# Patient Record
Sex: MALE | Race: WHITE | NOT HISPANIC OR LATINO | Employment: UNEMPLOYED | URBAN - NONMETROPOLITAN AREA
[De-identification: names, ages, dates, MRNs, and addresses within clinical notes are randomized per-mention and may not be internally consistent; named-entity substitution may affect disease eponyms.]

---

## 2020-11-03 ENCOUNTER — OFFICE VISIT (OUTPATIENT)
Dept: FAMILY MEDICINE CLINIC | Facility: CLINIC | Age: 50
End: 2020-11-03

## 2020-11-03 ENCOUNTER — LAB (OUTPATIENT)
Dept: LAB | Facility: HOSPITAL | Age: 50
End: 2020-11-03

## 2020-11-03 VITALS
HEIGHT: 71 IN | TEMPERATURE: 96.9 F | BODY MASS INDEX: 29.48 KG/M2 | DIASTOLIC BLOOD PRESSURE: 80 MMHG | SYSTOLIC BLOOD PRESSURE: 120 MMHG | OXYGEN SATURATION: 98 % | HEART RATE: 76 BPM | WEIGHT: 210.6 LBS

## 2020-11-03 DIAGNOSIS — F15.91 HISTORY OF METHAMPHETAMINE USE: ICD-10-CM

## 2020-11-03 DIAGNOSIS — F41.9 ANXIETY: ICD-10-CM

## 2020-11-03 DIAGNOSIS — Z12.5 SCREENING PSA (PROSTATE SPECIFIC ANTIGEN): ICD-10-CM

## 2020-11-03 DIAGNOSIS — N52.9 ERECTILE DYSFUNCTION, UNSPECIFIED ERECTILE DYSFUNCTION TYPE: ICD-10-CM

## 2020-11-03 DIAGNOSIS — Z00.00 GENERAL MEDICAL EXAM: Primary | ICD-10-CM

## 2020-11-03 DIAGNOSIS — G47.00 INSOMNIA, UNSPECIFIED TYPE: ICD-10-CM

## 2020-11-03 LAB
DEPRECATED RDW RBC AUTO: 41.1 FL (ref 37–54)
ERYTHROCYTE [DISTWIDTH] IN BLOOD BY AUTOMATED COUNT: 11.8 % (ref 12.3–15.4)
HCT VFR BLD AUTO: 43.2 % (ref 37.5–51)
HGB BLD-MCNC: 15.4 G/DL (ref 13–17.7)
MCH RBC QN AUTO: 33.7 PG (ref 26.6–33)
MCHC RBC AUTO-ENTMCNC: 35.6 G/DL (ref 31.5–35.7)
MCV RBC AUTO: 94.5 FL (ref 79–97)
PLATELET # BLD AUTO: 244 10*3/MM3 (ref 140–450)
PMV BLD AUTO: 12 FL (ref 6–12)
RBC # BLD AUTO: 4.57 10*6/MM3 (ref 4.14–5.8)
WBC # BLD AUTO: 11.05 10*3/MM3 (ref 3.4–10.8)

## 2020-11-03 PROCEDURE — 80053 COMPREHEN METABOLIC PANEL: CPT | Performed by: FAMILY MEDICINE

## 2020-11-03 PROCEDURE — 85027 COMPLETE CBC AUTOMATED: CPT | Performed by: FAMILY MEDICINE

## 2020-11-03 PROCEDURE — 99203 OFFICE O/P NEW LOW 30 MIN: CPT | Performed by: FAMILY MEDICINE

## 2020-11-03 PROCEDURE — G0103 PSA SCREENING: HCPCS | Performed by: FAMILY MEDICINE

## 2020-11-03 PROCEDURE — 80061 LIPID PANEL: CPT | Performed by: FAMILY MEDICINE

## 2020-11-03 RX ORDER — HYDROXYZINE PAMOATE 25 MG/1
25 CAPSULE ORAL EVERY 6 HOURS PRN
Qty: 120 CAPSULE | Refills: 1 | Status: SHIPPED | OUTPATIENT
Start: 2020-11-03 | End: 2023-03-30

## 2020-11-03 RX ORDER — OMEPRAZOLE 20 MG/1
20 CAPSULE, DELAYED RELEASE ORAL DAILY
Qty: 30 CAPSULE | Refills: 11 | Status: SHIPPED | OUTPATIENT
Start: 2020-11-03 | End: 2023-03-30

## 2020-11-03 RX ORDER — ATORVASTATIN CALCIUM 20 MG/1
20 TABLET, FILM COATED ORAL DAILY
Qty: 30 TABLET | Refills: 11 | Status: SHIPPED | OUTPATIENT
Start: 2020-11-03 | End: 2023-03-30

## 2020-11-03 RX ORDER — OMEPRAZOLE 20 MG/1
20 CAPSULE, DELAYED RELEASE ORAL DAILY
COMMUNITY
End: 2020-11-03 | Stop reason: SDUPTHER

## 2020-11-03 RX ORDER — BUPROPION HYDROCHLORIDE 150 MG/1
150 TABLET ORAL DAILY
Qty: 30 TABLET | Refills: 1 | Status: SHIPPED | OUTPATIENT
Start: 2020-11-03 | End: 2023-03-30

## 2020-11-03 RX ORDER — ATORVASTATIN CALCIUM 20 MG/1
20 TABLET, FILM COATED ORAL DAILY
COMMUNITY
End: 2020-11-03 | Stop reason: SDUPTHER

## 2020-11-04 NOTE — PROGRESS NOTES
" Subjective   Brian Ridley is a 50 y.o. male.     Chief Complaint   Patient presents with   • Establish Care   • Anxiety   • Insomnia             History of Present Illness     Recently released from 5 years of retirement for meth.  Trouble adjusting.  Anxiety and not sleeping well.   Working as a .  A friend of his takes xanax for his anxiety, he wants some as well. He doesn't want counseling.   Also, having erection problems   Doesn't want to work, no interest in things but likes his job.   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   While in retirement he was on prilosec 20mg qd and lipitor 20mg qd.     Pmh: none  Psh:  None  Sh; smokes cigarettes. denies etoh and drugs.   Fh:  Non contributory     Review of Systems   Constitutional: Negative for chills, fatigue and fever.   HENT: Negative for congestion, ear discharge, ear pain, facial swelling, hearing loss, postnasal drip, rhinorrhea, sinus pressure, sore throat, trouble swallowing and voice change.    Eyes: Negative for discharge, redness and visual disturbance.   Respiratory: Negative for cough, chest tightness, shortness of breath and wheezing.    Cardiovascular: Negative for chest pain and palpitations.   Gastrointestinal: Negative for abdominal pain, blood in stool, constipation, diarrhea, nausea and vomiting.   Endocrine: Negative for polydipsia and polyuria.   Genitourinary: Negative for dysuria, flank pain, hematuria and urgency.   Musculoskeletal: Negative for arthralgias, back pain, joint swelling and myalgias.   Skin: Negative for rash.   Neurological: Negative for dizziness, weakness, numbness and headaches.   Hematological: Negative for adenopathy.   Psychiatric/Behavioral: Negative for confusion and sleep disturbance. The patient is not nervous/anxious.            /80 (BP Location: Left arm, Patient Position: Sitting, Cuff Size: Adult)   Pulse 76   Temp 96.9 °F (36.1 °C) (Temporal)   Ht 180.3 cm (71\")   Wt 95.5 kg (210 lb 9.6 oz)   " SpO2 98%   BMI 29.37 kg/m²       Objective     Physical Exam  Vitals signs and nursing note reviewed.   Constitutional:       Appearance: Normal appearance. He is well-developed.   HENT:      Head: Normocephalic and atraumatic.      Right Ear: External ear normal.      Left Ear: External ear normal.      Nose: Nose normal. No rhinorrhea.      Mouth/Throat:      Comments: Poor dentition  Eyes:      General: No scleral icterus.     Extraocular Movements: Extraocular movements intact.      Conjunctiva/sclera: Conjunctivae normal.      Pupils: Pupils are equal, round, and reactive to light.   Neck:      Musculoskeletal: Normal range of motion and neck supple.   Cardiovascular:      Rate and Rhythm: Normal rate and regular rhythm.      Heart sounds: Normal heart sounds. No friction rub. No gallop.    Pulmonary:      Effort: Pulmonary effort is normal.      Breath sounds: Normal breath sounds.   Abdominal:      General: Bowel sounds are normal. There is no distension.      Palpations: Abdomen is soft.      Tenderness: There is no abdominal tenderness.   Musculoskeletal: Normal range of motion.         General: No deformity.   Skin:     General: Skin is warm and dry.      Findings: No erythema or rash.   Neurological:      Mental Status: He is alert and oriented to person, place, and time.      Cranial Nerves: No cranial nerve deficit.   Psychiatric:         Behavior: Behavior normal.         Thought Content: Thought content normal.         Judgment: Judgment normal.             PAST MEDICAL HISTORY   No past medical history on file.   PAST SURGICAL HISTORY   No past surgical history on file.   SOCIAL HISTORY     Social History     Socioeconomic History   • Marital status:      Spouse name: Not on file   • Number of children: Not on file   • Years of education: Not on file   • Highest education level: Not on file   Tobacco Use   • Smoking status: Current Every Day Smoker     Packs/day: 1.00     Years: 39.00      Pack years: 39.00     Types: Cigarettes     Start date: 11/3/1981   • Smokeless tobacco: Current User     Types: Chew   Substance and Sexual Activity   • Alcohol use: Not Currently   • Drug use: Not Currently   • Sexual activity: Defer      ALLERGIES   Patient has no known allergies.   MEDICATIONS     Current Outpatient Medications   Medication Sig Dispense Refill   • atorvastatin (LIPITOR) 20 MG tablet Take 1 tablet by mouth Daily. 30 tablet 11   • omeprazole (priLOSEC) 20 MG capsule Take 1 capsule by mouth Daily. 30 capsule 11   • buPROPion XL (Wellbutrin XL) 150 MG 24 hr tablet Take 1 tablet by mouth Daily. 30 tablet 1   • hydrOXYzine pamoate (Vistaril) 25 MG capsule Take 1 capsule by mouth Every 6 (Six) Hours As Needed for Anxiety. 120 capsule 1     No current facility-administered medications for this visit.         The following portions of the patient's history were reviewed and updated as appropriate: allergies, current medications, past family history, past medical history, past social history, past surgical history and problem list.        Assessment/Plan   Diagnoses and all orders for this visit:    1. General medical exam (Primary)  -     CBC (No Diff)  -     Comprehensive Metabolic Panel  -     Lipid Panel  -     PSA Screen    2. Screening PSA (prostate specific antigen)  -     PSA Screen    3. Anxiety    4. Insomnia, unspecified type    5. Erectile dysfunction, unspecified erectile dysfunction type    6. History of methamphetamine use    Other orders  -     buPROPion XL (Wellbutrin XL) 150 MG 24 hr tablet; Take 1 tablet by mouth Daily.  Dispense: 30 tablet; Refill: 1  -     hydrOXYzine pamoate (Vistaril) 25 MG capsule; Take 1 capsule by mouth Every 6 (Six) Hours As Needed for Anxiety.  Dispense: 120 capsule; Refill: 1  -     omeprazole (priLOSEC) 20 MG capsule; Take 1 capsule by mouth Daily.  Dispense: 30 capsule; Refill: 11  -     atorvastatin (LIPITOR) 20 MG tablet; Take 1 tablet by mouth Daily.   Dispense: 30 tablet; Refill: 11      Explained meth and damage to brain.  Increasing dopamine should help.   No xanax  Encouraged professional counseling, etc.  Refused.     Will call with lab results                 No follow-ups on file.                  This document has been electronically signed by Anthony Benitez MD on November 3, 2020 18:21 CST

## 2020-11-05 LAB
ALBUMIN SERPL-MCNC: 4.5 G/DL (ref 3.5–5.2)
ALBUMIN/GLOB SERPL: 1.7 G/DL
ALP SERPL-CCNC: 78 U/L (ref 39–117)
ALT SERPL W P-5'-P-CCNC: 38 U/L (ref 1–41)
ANION GAP SERPL CALCULATED.3IONS-SCNC: 12.1 MMOL/L (ref 5–15)
AST SERPL-CCNC: 20 U/L (ref 1–40)
BILIRUB SERPL-MCNC: 0.5 MG/DL (ref 0–1.2)
BUN SERPL-MCNC: 8 MG/DL (ref 6–20)
BUN/CREAT SERPL: 8.5 (ref 7–25)
CALCIUM SPEC-SCNC: 9.9 MG/DL (ref 8.6–10.5)
CHLORIDE SERPL-SCNC: 103 MMOL/L (ref 98–107)
CHOLEST SERPL-MCNC: 195 MG/DL (ref 0–200)
CO2 SERPL-SCNC: 24.9 MMOL/L (ref 22–29)
CREAT SERPL-MCNC: 0.94 MG/DL (ref 0.76–1.27)
GFR SERPL CREATININE-BSD FRML MDRD: 84 ML/MIN/1.73
GLOBULIN UR ELPH-MCNC: 2.7 GM/DL
GLUCOSE SERPL-MCNC: 105 MG/DL (ref 65–99)
HDLC SERPL-MCNC: 42 MG/DL (ref 40–60)
LDLC SERPL CALC-MCNC: 128 MG/DL (ref 0–100)
LDLC/HDLC SERPL: 2.99 {RATIO}
POTASSIUM SERPL-SCNC: 4.1 MMOL/L (ref 3.5–5.2)
PROT SERPL-MCNC: 7.2 G/DL (ref 6–8.5)
PSA SERPL-MCNC: 0.92 NG/ML (ref 0–4)
SODIUM SERPL-SCNC: 140 MMOL/L (ref 136–145)
TRIGL SERPL-MCNC: 138 MG/DL (ref 0–150)
VLDLC SERPL-MCNC: 25 MG/DL (ref 5–40)

## 2020-12-08 ENCOUNTER — OFFICE VISIT (OUTPATIENT)
Dept: FAMILY MEDICINE CLINIC | Facility: CLINIC | Age: 50
End: 2020-12-08

## 2020-12-08 VITALS
BODY MASS INDEX: 27.66 KG/M2 | SYSTOLIC BLOOD PRESSURE: 138 MMHG | HEART RATE: 80 BPM | TEMPERATURE: 97.8 F | DIASTOLIC BLOOD PRESSURE: 74 MMHG | WEIGHT: 197.6 LBS | OXYGEN SATURATION: 98 % | HEIGHT: 71 IN

## 2020-12-08 DIAGNOSIS — H60.391 OTHER INFECTIVE ACUTE OTITIS EXTERNA OF RIGHT EAR: Primary | ICD-10-CM

## 2020-12-08 DIAGNOSIS — M26.621 ARTHRALGIA OF RIGHT TEMPOROMANDIBULAR JOINT: ICD-10-CM

## 2020-12-08 PROCEDURE — 99213 OFFICE O/P EST LOW 20 MIN: CPT | Performed by: NURSE PRACTITIONER

## 2020-12-08 PROCEDURE — 96372 THER/PROPH/DIAG INJ SC/IM: CPT | Performed by: NURSE PRACTITIONER

## 2020-12-08 RX ORDER — TRIAMCINOLONE ACETONIDE 40 MG/ML
80 INJECTION, SUSPENSION INTRA-ARTICULAR; INTRAMUSCULAR ONCE
Status: COMPLETED | OUTPATIENT
Start: 2020-12-08 | End: 2020-12-08

## 2020-12-08 RX ORDER — OFLOXACIN 3 MG/ML
10 SOLUTION AURICULAR (OTIC) DAILY
Qty: 5 ML | Refills: 0 | Status: SHIPPED | OUTPATIENT
Start: 2020-12-08 | End: 2020-12-15

## 2020-12-08 RX ORDER — IBUPROFEN 600 MG/1
600 TABLET ORAL EVERY 8 HOURS PRN
Qty: 60 TABLET | Refills: 1 | Status: SHIPPED | OUTPATIENT
Start: 2020-12-08 | End: 2023-03-30

## 2020-12-08 RX ADMIN — TRIAMCINOLONE ACETONIDE 80 MG: 40 INJECTION, SUSPENSION INTRA-ARTICULAR; INTRAMUSCULAR at 09:56

## 2020-12-08 NOTE — PROGRESS NOTES
Subjective   Brian Ridley is a 50 y.o. male. Ear pain (right)    History of Present Illness   Ear Pain  Patient complains of ear pain and possible ear infection. Symptoms include right ear pain. Onset of symptoms was 1 month ago, and have been gradually worsening since that time. Associated symptoms include: jaw pain. Patient denies: achiness, chills, congestion, coryza, fever , headache, low grade fever, non productive cough, post nasal drip, productive cough, sinus pressure, sneezing and sore throat. He is drinking plenty of fluids.    The following portions of the patient's history were reviewed and updated as appropriate: allergies, current medications, past family history, past medical history, past social history, past surgical history, and problem list.    Review of Systems   Constitutional: Negative for chills, fatigue and fever.   HENT: Positive for ear pain (right). Negative for congestion, rhinorrhea and sore throat.    Eyes: Negative for blurred vision, double vision and visual disturbance.   Respiratory: Negative for cough, chest tightness, shortness of breath and wheezing.    Cardiovascular: Negative for chest pain, palpitations and leg swelling.   Gastrointestinal: Negative for abdominal pain, diarrhea, nausea and vomiting.   Endocrine: Negative for cold intolerance and heat intolerance.   Genitourinary: Negative for difficulty urinating, dysuria, frequency and hematuria.   Musculoskeletal: Positive for arthralgias (right TMJ). Negative for back pain, neck pain and neck stiffness.   Skin: Negative for dry skin, pallor, rash, skin lesions and bruise.   Allergic/Immunologic: Negative for environmental allergies, food allergies and immunocompromised state.   Neurological: Negative for dizziness, syncope, weakness, light-headedness, headache and confusion.   Hematological: Negative for adenopathy. Does not bruise/bleed easily.   Psychiatric/Behavioral: Negative for self-injury, sleep disturbance,  suicidal ideas, depressed mood and stress. The patient is not nervous/anxious.        Vitals:    12/08/20 0839   BP: 138/74   Pulse: 80   Temp: 97.8 °F (36.6 °C)   SpO2: 98%     Body mass index is 27.56 kg/m².    Objective   Physical Exam  Constitutional:       Appearance: He is well-developed.   HENT:      Head: Normocephalic.        Right Ear: Hearing, tympanic membrane, ear canal and external ear normal. Swelling present.      Left Ear: Hearing, tympanic membrane, ear canal and external ear normal.      Nose: Nose normal.      Mouth/Throat:      Pharynx: No oropharyngeal exudate.   Eyes:      Conjunctiva/sclera: Conjunctivae normal.      Pupils: Pupils are equal, round, and reactive to light.   Neck:      Musculoskeletal: Normal range of motion and neck supple.      Thyroid: No thyromegaly.   Cardiovascular:      Rate and Rhythm: Normal rate and regular rhythm.   Pulmonary:      Effort: Pulmonary effort is normal.      Breath sounds: Normal breath sounds.   Musculoskeletal: Normal range of motion.   Skin:     General: Skin is warm and dry.   Neurological:      Mental Status: He is alert and oriented to person, place, and time.   Psychiatric:         Attention and Perception: Attention normal.         Mood and Affect: Mood normal.         Speech: Speech normal.         Behavior: Behavior normal.         Thought Content: Thought content normal.         Cognition and Memory: Cognition normal.         Judgment: Judgment normal.           Assessment/Plan   Diagnoses and all orders for this visit:    1. Other infective acute otitis externa of right ear (Primary)  -     ofloxacin (FLOXIN) 0.3 % otic solution; Administer 10 drops to the right ear Daily for 7 days.  Dispense: 5 mL; Refill: 0    2. Arthralgia of right temporomandibular joint  -     triamcinolone acetonide (KENALOG-40) injection 80 mg  -     ibuprofen (ADVIL,MOTRIN) 600 MG tablet; Take 1 tablet by mouth Every 8 (Eight) Hours As Needed for Mild Pain  or  Moderate Pain .  Dispense: 60 tablet; Refill: 1    Pt instructed to use ofloxacin drops, 10 drops in right ear daily for 7 days.  Educated pt on TMJ syndrome discussed eating soft foods, ice, kenalog 80 mg given IM in office to reduce inflammation/pain, he can take ibuprofen PRN as directed. Needs to talk to dentist about getting a bite guard because I believe he grinds his teeth at night.  If symptoms do not improve or worsen, patient was instructed to return to clinic for further evaluation.         This document has been electronically signed by SOHAIL Schilling on  December 8, 2020 09:49 CST

## 2020-12-08 NOTE — PATIENT INSTRUCTIONS
Temporomandibular Joint Syndrome    Temporomandibular joint syndrome (TMJ syndrome) is a condition that causes pain in the temporomandibular joints. These joints are located near your ears and allow your jaw to open and close. For people with TMJ syndrome, chewing, biting, or other movements of the jaw can be difficult or painful.  TMJ syndrome is often mild and goes away within a few weeks. However, sometimes the condition becomes a long-term (chronic) problem.  What are the causes?  This condition may be caused by:  · Grinding your teeth or clenching your jaw. Some people do this when they are under stress.  · Arthritis.  · Injury to the jaw.  · Head or neck injury.  · Teeth or dentures that are not aligned well.  In some cases, the cause of TMJ syndrome may not be known.  What are the signs or symptoms?  The most common symptom of this condition is an aching pain on the side of the head in the area of the TMJ. Other symptoms may include:  · Pain when moving your jaw, such as when chewing or biting.  · Being unable to open your jaw all the way.  · Making a clicking sound when you open your mouth.  · Headache.  · Earache.  · Neck or shoulder pain.  How is this diagnosed?  This condition may be diagnosed based on:  · Your symptoms and medical history.  · A physical exam. Your health care provider may check the range of motion of your jaw.  · Imaging tests, such as X-rays or an MRI.  You may also need to see your dentist, who will determine if your teeth and jaw are lined up correctly.  How is this treated?  TMJ syndrome often goes away on its own. If treatment is needed, the options may include:  · Eating soft foods and applying ice or heat.  · Medicines to relieve pain or inflammation.  · Medicines or massage to relax the muscles.  · A splint, bite plate, or mouthpiece to prevent teeth grinding or jaw clenching.  · Relaxation techniques or counseling to help reduce stress.  · A therapy for pain in which an  electrical current is applied to the nerves through the skin (transcutaneous electrical nerve stimulation).  · Acupuncture. This is sometimes helpful to relieve pain.  · Jaw surgery. This is rarely needed.  Follow these instructions at home:    Eating and drinking  · Eat a soft diet if you are having trouble chewing.  · Avoid foods that require a lot of chewing. Do not chew gum.  General instructions  · Take over-the-counter and prescription medicines only as told by your health care provider.  · If directed, put ice on the painful area.  ? Put ice in a plastic bag.  ? Place a towel between your skin and the bag.  ? Leave the ice on for 20 minutes, 2-3 times a day.  · Apply a warm, wet cloth (warm compress) to the painful area as directed.  · Massage your jaw area and do any jaw stretching exercises as told by your health care provider.  · If you were given a splint, bite plate, or mouthpiece, wear it as told by your health care provider.  · Keep all follow-up visits as told by your health care provider. This is important.  Contact a health care provider if:  · You are having trouble eating.  · You have new or worsening symptoms.  Get help right away if:  · Your jaw locks open or closed.  Summary  · Temporomandibular joint syndrome (TMJ syndrome) is a condition that causes pain in the temporomandibular joints. These joints are located near your ears and allow your jaw to open and close.  · TMJ syndrome is often mild and goes away within a few weeks. However, sometimes the condition becomes a long-term (chronic) problem.  · Symptoms include an aching pain on the side of the head in the area of the TMJ, pain when chewing or biting, and being unable to open your jaw all the way. You may also make a clicking sound when you open your mouth.  · TMJ syndrome often goes away on its own. If treatment is needed, it may include medicines to relieve pain, reduce inflammation, or relax the muscles. A splint, bite plate, or  mouthpiece may also be used to prevent teeth grinding or jaw clenching.  This information is not intended to replace advice given to you by your health care provider. Make sure you discuss any questions you have with your health care provider.  Document Revised: 03/01/2019 Document Reviewed: 01/29/2019  Elsevier Patient Education © 2020 [employer]

## 2021-04-19 ENCOUNTER — OFFICE VISIT (OUTPATIENT)
Dept: FAMILY MEDICINE CLINIC | Facility: CLINIC | Age: 51
End: 2021-04-19

## 2021-04-19 VITALS
WEIGHT: 202.4 LBS | OXYGEN SATURATION: 99 % | BODY MASS INDEX: 28.34 KG/M2 | SYSTOLIC BLOOD PRESSURE: 110 MMHG | DIASTOLIC BLOOD PRESSURE: 70 MMHG | HEART RATE: 78 BPM | HEIGHT: 71 IN | TEMPERATURE: 96.9 F

## 2021-04-19 DIAGNOSIS — M79.642 HAND PAIN, LEFT: Primary | ICD-10-CM

## 2021-04-19 PROCEDURE — 99212 OFFICE O/P EST SF 10 MIN: CPT | Performed by: FAMILY MEDICINE

## 2021-04-19 RX ORDER — HYDROCODONE BITARTRATE AND ACETAMINOPHEN 5; 325 MG/1; MG/1
TABLET ORAL
COMMUNITY
Start: 2021-03-30 | End: 2023-03-30

## 2021-04-19 NOTE — PROGRESS NOTES
" Subjective   Brian Ridley is a 51 y.o. male.     Chief Complaint   Patient presents with   • joint pain   • Cyst             History of Present Illness     Posterior 3rd mcp joint swollen, hurts some.  Wants to make sure its nothing bad.  That finger will get caught sometimes as well.     Review of Systems   Constitutional: Negative for chills, fatigue and fever.   HENT: Negative for congestion, ear discharge, ear pain, facial swelling, hearing loss, postnasal drip, rhinorrhea, sinus pressure, sore throat, trouble swallowing and voice change.    Eyes: Negative for discharge, redness and visual disturbance.   Respiratory: Negative for cough, chest tightness, shortness of breath and wheezing.    Cardiovascular: Negative for chest pain and palpitations.   Gastrointestinal: Negative for abdominal pain, blood in stool, constipation, diarrhea, nausea and vomiting.   Endocrine: Negative for polydipsia and polyuria.   Genitourinary: Negative for dysuria, flank pain, hematuria and urgency.   Musculoskeletal: Negative for arthralgias, back pain, joint swelling and myalgias.   Skin: Negative for rash.   Neurological: Negative for dizziness, weakness, numbness and headaches.   Hematological: Negative for adenopathy.   Psychiatric/Behavioral: Negative for confusion and sleep disturbance. The patient is not nervous/anxious.            /70 (BP Location: Left arm, Patient Position: Sitting, Cuff Size: Adult)   Pulse 78   Temp 96.9 °F (36.1 °C) (Infrared)   Ht 180.3 cm (70.98\")   Wt 91.8 kg (202 lb 6.4 oz)   SpO2 99%   BMI 28.24 kg/m²       Objective     Physical Exam  Vitals and nursing note reviewed.   Constitutional:       Appearance: Normal appearance. He is well-developed.   HENT:      Head: Normocephalic and atraumatic.      Right Ear: External ear normal.      Left Ear: External ear normal.      Nose: Nose normal.   Eyes:      Extraocular Movements: Extraocular movements intact.      Conjunctiva/sclera: " Conjunctivae normal.      Pupils: Pupils are equal, round, and reactive to light.   Pulmonary:      Effort: Pulmonary effort is normal.   Musculoskeletal:         General: Normal range of motion.      Cervical back: Normal range of motion.      Comments: He showed me how the finger catches, I did not feel any knot on palm.   His 3rd mcp joint skin is prominent but not fluid filled.      Neurological:      General: No focal deficit present.      Mental Status: He is alert and oriented to person, place, and time.   Psychiatric:         Behavior: Behavior normal.         Thought Content: Thought content normal.         Judgment: Judgment normal.             PAST MEDICAL HISTORY   No past medical history on file.   PAST SURGICAL HISTORY   No past surgical history on file.   SOCIAL HISTORY     Social History     Socioeconomic History   • Marital status:      Spouse name: Not on file   • Number of children: Not on file   • Years of education: Not on file   • Highest education level: Not on file   Tobacco Use   • Smoking status: Current Every Day Smoker     Packs/day: 1.00     Years: 39.00     Pack years: 39.00     Types: Cigarettes     Start date: 11/3/1981   • Smokeless tobacco: Current User     Types: Chew   Substance and Sexual Activity   • Alcohol use: Not Currently   • Drug use: Not Currently   • Sexual activity: Defer      ALLERGIES   Patient has no known allergies.   MEDICATIONS     Current Outpatient Medications   Medication Sig Dispense Refill   • HYDROcodone-acetaminophen (NORCO) 5-325 MG per tablet TAKE 1 TABLET BY MOUTH EVERY 4 HOURS AS NEEDED FOR PAIN - - MAY CAUSE DROWSINESS     • ibuprofen (ADVIL,MOTRIN) 600 MG tablet Take 1 tablet by mouth Every 8 (Eight) Hours As Needed for Mild Pain  or Moderate Pain . 60 tablet 1   • atorvastatin (LIPITOR) 20 MG tablet Take 1 tablet by mouth Daily. 30 tablet 11   • buPROPion XL (Wellbutrin XL) 150 MG 24 hr tablet Take 1 tablet by mouth Daily. 30 tablet 1   •  hydrOXYzine pamoate (Vistaril) 25 MG capsule Take 1 capsule by mouth Every 6 (Six) Hours As Needed for Anxiety. 120 capsule 1   • omeprazole (priLOSEC) 20 MG capsule Take 1 capsule by mouth Daily. 30 capsule 11     No current facility-administered medications for this visit.        The following portions of the patient's history were reviewed and updated as appropriate: allergies, current medications, past family history, past medical history, past social history, past surgical history and problem list.        Assessment/Plan   Diagnoses and all orders for this visit:    1. Hand pain, left (Primary)  -     XR Hand 3+ View Left  -     Cancel: Ambulatory Referral to Orthopedic Surgery  -     Ambulatory Referral to Orthopedic Surgery      xrays looks ok.  Will see what ortho thinks.                  No follow-ups on file.                  This document has been electronically signed by Anthony Benitez MD on April 19, 2021 09:04 CDT

## 2021-06-16 ENCOUNTER — OFFICE VISIT (OUTPATIENT)
Dept: ORTHOPEDIC SURGERY | Facility: CLINIC | Age: 51
End: 2021-06-16

## 2021-06-16 VITALS — WEIGHT: 209 LBS | BODY MASS INDEX: 29.92 KG/M2 | HEIGHT: 70 IN

## 2021-06-16 DIAGNOSIS — M65.331 TRIGGER MIDDLE FINGER OF RIGHT HAND: ICD-10-CM

## 2021-06-16 DIAGNOSIS — M79.644 PAIN IN FINGER OF RIGHT HAND: ICD-10-CM

## 2021-06-16 DIAGNOSIS — M79.645 PAIN OF FINGER OF LEFT HAND: Primary | ICD-10-CM

## 2021-06-16 DIAGNOSIS — M65.332 TRIGGER MIDDLE FINGER OF LEFT HAND: ICD-10-CM

## 2021-06-16 PROCEDURE — 99214 OFFICE O/P EST MOD 30 MIN: CPT | Performed by: NURSE PRACTITIONER

## 2021-06-16 NOTE — PROGRESS NOTES
Brian Ridley is a 51 y.o. male   Primary provider:  Anthony Benitez MD       Chief Complaint   Patient presents with   • Left Hand - Pain     Middle finger.        HISTORY OF PRESENT ILLNESS:    51-year-old male patient presents to office for evaluation of chronic bilateral middle finger pain and locking.  Onset of pain/symptoms occurred approximately 3 years ago and has continued to progressively worsen.  Patient reports worse pain and symptoms in the left hand/middle finger.  Patient describes pain with range of motion of his middle fingers bilaterally with intermittent locking/catching symptoms in the left middle finger.  Patient states that his left middle finger is often locked in flexion when he awakens in the mornings and he has to passively extend it. Patient also complains of a mass/cyst/swelling at the top of his middle knuckle on the left hand.  Pain is described as intermittent and moderate in severity.  Pain is described as aching and grinding in nature with associated popping/locking sensations and joint swelling.  Pain is worse with movement/use of the bilateral hands/middle fingers, especially exertional activity.  Pain improves mildly with rest.  No other treatments have been tried.  Pain scale today is 5/10.  X-rays were performed previously per Dr. Benitez on 4/19/2021.    Pain  This is a chronic problem. The current episode started more than 1 year ago (Approximately 3 years ago). The problem occurs intermittently. The problem has been gradually worsening. Associated symptoms include arthralgias and joint swelling. Associated symptoms comments: Aching, grinding pain; swelling, popping/locking/catching. The symptoms are aggravated by exertion (Use of bilateral hands, range of motion of middle fingers bilaterally). He has tried rest for the symptoms. The treatment provided mild relief.     CONCURRENT MEDICAL HISTORY:    No past medical history on file.    No Known Allergies      Current  Outpatient Medications:   •  atorvastatin (LIPITOR) 20 MG tablet, Take 1 tablet by mouth Daily., Disp: 30 tablet, Rfl: 11  •  buPROPion XL (Wellbutrin XL) 150 MG 24 hr tablet, Take 1 tablet by mouth Daily., Disp: 30 tablet, Rfl: 1  •  HYDROcodone-acetaminophen (NORCO) 5-325 MG per tablet, TAKE 1 TABLET BY MOUTH EVERY 4 HOURS AS NEEDED FOR PAIN - - MAY CAUSE DROWSINESS, Disp: , Rfl:   •  hydrOXYzine pamoate (Vistaril) 25 MG capsule, Take 1 capsule by mouth Every 6 (Six) Hours As Needed for Anxiety., Disp: 120 capsule, Rfl: 1  •  ibuprofen (ADVIL,MOTRIN) 600 MG tablet, Take 1 tablet by mouth Every 8 (Eight) Hours As Needed for Mild Pain  or Moderate Pain ., Disp: 60 tablet, Rfl: 1  •  omeprazole (priLOSEC) 20 MG capsule, Take 1 capsule by mouth Daily., Disp: 30 capsule, Rfl: 11    No past surgical history on file.    No family history on file.     Social History     Socioeconomic History   • Marital status:      Spouse name: Not on file   • Number of children: Not on file   • Years of education: Not on file   • Highest education level: Not on file   Tobacco Use   • Smoking status: Current Every Day Smoker     Packs/day: 1.00     Years: 39.00     Pack years: 39.00     Types: Cigarettes     Start date: 11/3/1981   • Smokeless tobacco: Current User     Types: Chew   Substance and Sexual Activity   • Alcohol use: Not Currently   • Drug use: Not Currently   • Sexual activity: Defer        Review of Systems   Constitutional: Negative.    HENT: Negative.    Eyes: Negative.    Respiratory: Negative.    Cardiovascular: Negative.    Gastrointestinal: Negative.    Endocrine: Negative.    Genitourinary: Negative.    Musculoskeletal: Positive for arthralgias and joint swelling.        Right middle finger pain. Left middle finger pain. Left hand swelling/cyst.    Skin: Negative.    Allergic/Immunologic: Negative.    Neurological: Negative.    Hematological: Negative.    Psychiatric/Behavioral: Negative.        PHYSICAL  "EXAMINATION:       Ht 177.8 cm (70\")   Wt 94.8 kg (209 lb)   BMI 29.99 kg/m²     Physical Exam  Vitals reviewed.   Constitutional:       General: He is not in acute distress.     Appearance: He is well-developed. He is not ill-appearing.   HENT:      Head: Normocephalic.   Pulmonary:      Effort: Pulmonary effort is normal. No respiratory distress.   Abdominal:      General: There is no distension.      Palpations: Abdomen is soft.   Musculoskeletal:         General: Swelling (Left hand (middle MCP joint)) and tenderness (Right hand, Left hand) present. No deformity or signs of injury.   Skin:     General: Skin is warm and dry.      Capillary Refill: Capillary refill takes less than 2 seconds.      Findings: No erythema.   Neurological:      Mental Status: He is alert and oriented to person, place, and time.      GCS: GCS eye subscore is 4. GCS verbal subscore is 5. GCS motor subscore is 6.   Psychiatric:         Speech: Speech normal.         Behavior: Behavior normal.         Thought Content: Thought content normal.         Judgment: Judgment normal.         GAIT:     [x]  Normal  []  Antalgic    Assistive device: [x]  None  []  Walker     []  Crutches  []  Cane     []  Wheelchair  []  Stretcher    Right Hand Exam     Tenderness   The patient is experiencing tenderness in the palmar area (Middle finger).    Range of Motion   Wrist   Extension: normal   Flexion: normal   Pronation: normal   Supination: normal   Hand   MP Middle: abnormal   PIP Middle: abnormal   DIP Middle: abnormal     Muscle Strength   The patient has normal right wrist strength.    Other   Erythema: absent  Sensation: normal  Pulse: present    Comments:  Mild pain and limitations with range of motion of the middle finger.  Mild tenderness to palpation in the palmar hand at the base of the middle finger at the flexor tendon with a small, tender nodule palpable.  No swelling appreciated.  No erythema.  No warmth.  No signs of infection noted.  " Capillary refill is less than 3 seconds.  Neurovascularly intact.      Left Hand Exam     Tenderness   The patient is experiencing tenderness in the palmar area (Middle finger).     Range of Motion   Wrist   Extension: normal   Flexion: normal   Pronation: normal   Supination: normal   Hand   MP Middle: abnormal   PIP Middle: abnormal   DIP Middle: abnormal     Muscle Strength   The patient has normal left wrist strength.    Other   Erythema: absent  Sensation: normal  Pulse: present    Comments:  Pain and mild limitations with range of motion.  Active triggering noted with flexion and extension of the middle finger.  Tenderness to palpation in the palmar hand at the base of the middle finger at the flexor tendon with a small, tender nodule palpable.  No swelling appreciated. No erythema.  No warmth.  No signs of infection noted. There is also a mass, possible cyst noted at the dorsal hand at the MCP joint that is nontender to palpation.  Capillary refill is less than 3 seconds.  Neurovascularly intact.              PROCEDURE: XR HAND 3+ VW LEFT     VIEWS: 3     INDICATION: Pain     COMPARISON: None     FINDINGS:     - fracture: None    - alignment: Within normal limits    - misc: Soft tissue swelling overlies the third metatarsal  head, best appreciated on the lateral view        IMPRESSION:  Soft tissue swelling, as above, without acute  osseous abnormality identified.        Note:  if pain or symptoms persist beyond reasonable expectations     and follow-up imaging is anticipated,  cross sectional imaging   (CT and/or MRI) is suggested, as is deemed clinically   appropriate.     Electronically signed by:  Karolina Srinivasan MD  4/19/2021 2:04 PM CDT  Workstation: [id number]    ASSESSMENT:    Diagnoses and all orders for this visit:    Pain of finger of left hand  -     Injection Tendon or Ligament    Trigger middle finger of left hand  -     Injection Tendon or Ligament    Pain in finger of right hand  -     Injection  Tendon or Ligament    Trigger middle finger of right hand  -     Injection Tendon or Ligament      Injection Tendon or Ligament    Date/Time: 6/16/2021 11:18 AM  Performed by: Amelia Holt APRN  Authorized by: Amelia Holt APRN   Preparation: Patient was prepped and draped in the usual sterile fashion.  Local anesthesia used: no    Anesthesia:  Local anesthesia used: no    Sedation:  Patient sedated: no    Patient tolerance: Patient tolerated the procedure well with no immediate complications  Comments: .3cc 1% Lidocaine and .3cc Triamcinolone injected into right middle finger for trigger finger injection.   Lidocaine nd 18391-982-43  Lot [id number]   Triamcinolone ndc [address]-4 lot [id number]    Injection Tendon or Ligament    Date/Time: 6/16/2021 11:20 AM  Performed by: Amelia Holt APRN  Authorized by: Amelia Holt APRN   Preparation: Patient was prepped and draped in the usual sterile fashion.  Local anesthesia used: no    Anesthesia:  Local anesthesia used: no    Sedation:  Patient sedated: no    Patient tolerance: Patient tolerated the procedure well with no immediate complications  Comments: .3cc 1% Lidocaine and .3cc Triamcinolone injected into left middle finger for trigger finger injection  Lidocaine ndc 64426-879-76  Lot [id number]   Triamcinolone ndc [address]-9 lot [id number]      PLAN    X-rays of the left hand from 4/19/2021 are independently reviewed by myself today with no acute findings noted.  No significant degenerative changes are noted.  Subjective complaints and physical exam are consistent with stenosing tenosynovitis or trigger finger of the bilateral middle fingers.  Patient has active triggering noted of the left middle finger.  Patient has no active triggering at this time in the right middle finger but does have pain with range of motion as well as localized tenderness in the palmar hand at the flexor tendon pulley.  Patient also has a fairly large cyst/mass to the dorsal aspect  of the left hand overlying the MCP joint.  This is nontender and does not seem to limit his motion but he thinks it is unsightly.  The joint itself looks normal on x-ray and we discussed this is likely a ganglion cyst.  We discussed that recurrence rate is very high with attempted aspiration/injection and I have instructed the patient that he will need to follow-up with one of the orthopedic surgeons to discuss excision of this if that is what he would like to proceed with.  For today, recommend proceeding with trigger finger injections of the bilateral middle fingers with steroid for management of pain/inflammation.  We discussed that if the injections do not improve his pain and symptoms indefinitely, then he will need a trigger finger release performed at least on the left middle finger.  Patient verbalized understanding.  Recommend rest and activity modification to facilitate rest and healing.  We also discussed use of a finger splint at nighttime to prevent the middle fingers from locking in flexion during sleep and he should be able to do discontinue the finger splints as his pain improves with the injections.  Recommend ice therapy as needed to minimize pain/inflammation/swelling.  Recommend Tylenol, Aleve or Ibuprofen as needed for pain/discomfort.  Follow-up in 4 weeks for recheck as needed for any new, worsening or persistent symptoms.  If his pain/symptoms improve, he can follow-up on an as-needed basis.    EMR Dragon/Transciption Disclaimer: Some of this note may be an electronic transcription/translation of spoken language to printed text.  The electronic translation of spoken language may permit erroneous, or at times, nonsensical words or phrases to be inadvertently transcribed. Although I have reviewed the note for such errors, some may still exist.     Return in about 4 weeks (around 7/14/2021), or if symptoms worsen or fail to improve, for Recheck.        This document has been electronically  signed by SOHAIL Lan on June 19, 2021 18:30 CDT      SOHAIL Lan

## 2023-03-30 ENCOUNTER — OFFICE VISIT (OUTPATIENT)
Dept: FAMILY MEDICINE CLINIC | Facility: CLINIC | Age: 53
End: 2023-03-30
Payer: COMMERCIAL

## 2023-03-30 ENCOUNTER — LAB (OUTPATIENT)
Dept: LAB | Facility: HOSPITAL | Age: 53
End: 2023-03-30
Payer: COMMERCIAL

## 2023-03-30 VITALS
HEIGHT: 70 IN | HEART RATE: 86 BPM | WEIGHT: 221 LBS | OXYGEN SATURATION: 98 % | SYSTOLIC BLOOD PRESSURE: 137 MMHG | DIASTOLIC BLOOD PRESSURE: 87 MMHG | BODY MASS INDEX: 31.64 KG/M2 | TEMPERATURE: 97.5 F

## 2023-03-30 DIAGNOSIS — M54.2 NECK PAIN: ICD-10-CM

## 2023-03-30 DIAGNOSIS — M25.522 LEFT ELBOW PAIN: ICD-10-CM

## 2023-03-30 DIAGNOSIS — R20.2 PARESTHESIAS IN LEFT HAND: ICD-10-CM

## 2023-03-30 DIAGNOSIS — Z00.00 ANNUAL PHYSICAL EXAM: Primary | ICD-10-CM

## 2023-03-30 DIAGNOSIS — Z12.5 SCREENING FOR MALIGNANT NEOPLASM OF PROSTATE: ICD-10-CM

## 2023-03-30 PROCEDURE — 82607 VITAMIN B-12: CPT | Performed by: FAMILY MEDICINE

## 2023-03-30 PROCEDURE — 85027 COMPLETE CBC AUTOMATED: CPT | Performed by: FAMILY MEDICINE

## 2023-03-30 PROCEDURE — 80061 LIPID PANEL: CPT | Performed by: FAMILY MEDICINE

## 2023-03-30 PROCEDURE — 84207 ASSAY OF VITAMIN B-6: CPT | Performed by: FAMILY MEDICINE

## 2023-03-30 PROCEDURE — 80053 COMPREHEN METABOLIC PANEL: CPT | Performed by: FAMILY MEDICINE

## 2023-03-30 PROCEDURE — G0103 PSA SCREENING: HCPCS | Performed by: FAMILY MEDICINE

## 2023-03-30 PROCEDURE — 84425 ASSAY OF VITAMIN B-1: CPT | Performed by: FAMILY MEDICINE

## 2023-03-30 PROCEDURE — 82746 ASSAY OF FOLIC ACID SERUM: CPT | Performed by: FAMILY MEDICINE

## 2023-03-30 NOTE — PROGRESS NOTES
" Subjective   Brian Ridley is a 53 y.o. male.     Chief Complaint   Patient presents with   • Hand Pain         CC:  ANNUAL EXAM AND HAND PAIN.     History of Present Illness     He fell off top of a building, ever since, left elbow and 5th digit numbness.  He says he has always  Had problems with his neck also  He is over a year past being seen and having his annual labs.     Review of Systems   Constitutional: Negative for chills, fatigue and fever.   HENT: Negative for congestion, ear discharge, ear pain, facial swelling, hearing loss, postnasal drip, rhinorrhea, sinus pressure, sore throat, trouble swallowing and voice change.    Eyes: Negative for discharge, redness and visual disturbance.   Respiratory: Negative for cough, chest tightness, shortness of breath and wheezing.    Cardiovascular: Negative for chest pain and palpitations.   Gastrointestinal: Negative for abdominal pain, blood in stool, constipation, diarrhea, nausea and vomiting.   Endocrine: Negative for polydipsia and polyuria.   Genitourinary: Negative for dysuria, flank pain, hematuria and urgency.   Musculoskeletal: Negative for arthralgias, back pain, joint swelling and myalgias.   Skin: Negative for rash.   Neurological: Positive for numbness. Negative for dizziness, weakness and headaches.   Hematological: Negative for adenopathy.   Psychiatric/Behavioral: Negative for confusion and sleep disturbance. The patient is not nervous/anxious.            /87 (BP Location: Left arm, Patient Position: Sitting, Cuff Size: Adult)   Pulse 86   Temp 97.5 °F (36.4 °C) (Infrared)   Ht 177.8 cm (70\")   Wt 100 kg (221 lb)   SpO2 98%   BMI 31.71 kg/m²       Objective     Physical Exam  Vitals and nursing note reviewed.   Constitutional:       Appearance: Normal appearance. He is well-developed.   HENT:      Head: Normocephalic and atraumatic.      Right Ear: External ear normal.      Left Ear: External ear normal.      Nose: Nose normal. "   Eyes:      Extraocular Movements: Extraocular movements intact.      Conjunctiva/sclera: Conjunctivae normal.      Pupils: Pupils are equal, round, and reactive to light.   Pulmonary:      Effort: Pulmonary effort is normal.   Musculoskeletal:         General: Normal range of motion.      Cervical back: Normal range of motion.      Comments: Wasting of intrinsic muscles of left hand.    Neurological:      General: No focal deficit present.      Mental Status: He is alert and oriented to person, place, and time.   Psychiatric:         Behavior: Behavior normal.         Thought Content: Thought content normal.         Judgment: Judgment normal.             PAST MEDICAL HISTORY   No past medical history on file.   PAST SURGICAL HISTORY   No past surgical history on file.   SOCIAL HISTORY     Social History     Socioeconomic History   • Marital status:    Tobacco Use   • Smoking status: Every Day     Packs/day: 1.00     Years: 39.00     Pack years: 39.00     Types: Cigarettes     Start date: 11/3/1981     Passive exposure: Current   • Smokeless tobacco: Current     Types: Chew   Substance and Sexual Activity   • Alcohol use: Not Currently   • Drug use: Not Currently   • Sexual activity: Defer      ALLERGIES   Patient has no known allergies.   MEDICATIONS     No current outpatient medications on file.     No current facility-administered medications for this visit.        The following portions of the patient's history were reviewed and updated as appropriate: allergies, current medications, past family history, past medical history, past social history, past surgical history and problem list.        Assessment & Plan   Diagnoses and all orders for this visit:    1. Annual physical exam (Primary)  -     CBC (No Diff)  -     Lipid Panel  -     Comprehensive Metabolic Panel    2. Paresthesias in left hand  -     Folate  -     Vitamin B1, Whole Blood  -     Vitamin B12  -     Vitamin B6  -     Ambulatory Referral  to Orthopedic Surgery    3. Screening for malignant neoplasm of prostate  -     PSA Screen    4. Neck pain  -     XR Spine Cervical Complete 4 or 5 View    5. Left elbow pain  -     XR Elbow 2 View Left (In Office)        COUNSELED ON PROSTATE CANCER SCREENING AND COLON CANCER SCREENING.  HE WAS GIVEN A STOOL FIT TEST.     Will call with labs    Referral to ortho.                No follow-ups on file.                  This document has been electronically signed by Anthony Benitez MD on March 30, 2023 13:11 CDT

## 2023-03-31 LAB
ALBUMIN SERPL-MCNC: 4.2 G/DL (ref 3.5–5.2)
ALBUMIN/GLOB SERPL: 1.6 G/DL
ALP SERPL-CCNC: 83 U/L (ref 39–117)
ALT SERPL W P-5'-P-CCNC: 28 U/L (ref 1–41)
ANION GAP SERPL CALCULATED.3IONS-SCNC: 9 MMOL/L (ref 5–15)
AST SERPL-CCNC: 18 U/L (ref 1–40)
BILIRUB SERPL-MCNC: <0.2 MG/DL (ref 0–1.2)
BUN SERPL-MCNC: 12 MG/DL (ref 6–20)
BUN/CREAT SERPL: 11.8 (ref 7–25)
CALCIUM SPEC-SCNC: 9.4 MG/DL (ref 8.6–10.5)
CHLORIDE SERPL-SCNC: 104 MMOL/L (ref 98–107)
CHOLEST SERPL-MCNC: 199 MG/DL (ref 0–200)
CO2 SERPL-SCNC: 24 MMOL/L (ref 22–29)
CREAT SERPL-MCNC: 1.02 MG/DL (ref 0.76–1.27)
DEPRECATED RDW RBC AUTO: 40.2 FL (ref 37–54)
EGFRCR SERPLBLD CKD-EPI 2021: 86.8 ML/MIN/1.73
ERYTHROCYTE [DISTWIDTH] IN BLOOD BY AUTOMATED COUNT: 12 % (ref 12.3–15.4)
FOLATE SERPL-MCNC: 9.36 NG/ML (ref 4.78–24.2)
GLOBULIN UR ELPH-MCNC: 2.6 GM/DL
GLUCOSE SERPL-MCNC: 111 MG/DL (ref 65–99)
HCT VFR BLD AUTO: 45.8 % (ref 37.5–51)
HDLC SERPL-MCNC: 48 MG/DL (ref 40–60)
HGB BLD-MCNC: 15.9 G/DL (ref 13–17.7)
LDLC SERPL CALC-MCNC: 130 MG/DL (ref 0–100)
LDLC/HDLC SERPL: 2.65 {RATIO}
MCH RBC QN AUTO: 32.1 PG (ref 26.6–33)
MCHC RBC AUTO-ENTMCNC: 34.7 G/DL (ref 31.5–35.7)
MCV RBC AUTO: 92.3 FL (ref 79–97)
PLATELET # BLD AUTO: 285 10*3/MM3 (ref 140–450)
PMV BLD AUTO: 11 FL (ref 6–12)
POTASSIUM SERPL-SCNC: 4.1 MMOL/L (ref 3.5–5.2)
PROT SERPL-MCNC: 6.8 G/DL (ref 6–8.5)
PSA SERPL-MCNC: 0.53 NG/ML (ref 0–4)
RBC # BLD AUTO: 4.96 10*6/MM3 (ref 4.14–5.8)
SODIUM SERPL-SCNC: 137 MMOL/L (ref 136–145)
TRIGL SERPL-MCNC: 118 MG/DL (ref 0–150)
VIT B12 BLD-MCNC: 391 PG/ML (ref 211–946)
VLDLC SERPL-MCNC: 21 MG/DL (ref 5–40)
WBC NRBC COR # BLD: 7.64 10*3/MM3 (ref 3.4–10.8)

## 2023-04-03 LAB — PYRIDOXAL PHOS SERPL-MCNC: 6.4 UG/L (ref 3.4–65.2)

## 2023-04-03 RX ORDER — PRAVASTATIN SODIUM 40 MG
40 TABLET ORAL NIGHTLY
Qty: 90 TABLET | Refills: 3 | Status: SHIPPED | OUTPATIENT
Start: 2023-04-03

## 2023-04-04 DIAGNOSIS — Z12.11 ENCOUNTER FOR SCREENING FECAL OCCULT BLOOD TESTING: Primary | ICD-10-CM

## 2023-04-04 LAB
HEMOCCULT STL QL IA: NEGATIVE
VIT B1 BLD-SCNC: 158.3 NMOL/L (ref 66.5–200)

## 2023-04-04 PROCEDURE — 82274 ASSAY TEST FOR BLOOD FECAL: CPT | Performed by: FAMILY MEDICINE

## 2023-04-13 DIAGNOSIS — M79.645 PAIN OF FINGER OF LEFT HAND: Primary | ICD-10-CM

## 2023-04-13 DIAGNOSIS — M79.642 LEFT HAND PAIN: ICD-10-CM

## 2023-04-14 ENCOUNTER — OFFICE VISIT (OUTPATIENT)
Dept: ORTHOPEDIC SURGERY | Facility: CLINIC | Age: 53
End: 2023-04-14
Payer: COMMERCIAL

## 2023-04-14 VITALS — HEIGHT: 70 IN | BODY MASS INDEX: 31.64 KG/M2 | WEIGHT: 221 LBS

## 2023-04-14 DIAGNOSIS — R20.0 NUMBNESS AND TINGLING IN LEFT HAND: Primary | ICD-10-CM

## 2023-04-14 DIAGNOSIS — G56.22 CUBITAL TUNNEL SYNDROME ON LEFT: ICD-10-CM

## 2023-04-14 DIAGNOSIS — M25.522 CHRONIC PAIN OF LEFT ELBOW: ICD-10-CM

## 2023-04-14 DIAGNOSIS — M50.30 DEGENERATIVE DISC DISEASE, CERVICAL: ICD-10-CM

## 2023-04-14 DIAGNOSIS — M65.332 TRIGGER MIDDLE FINGER OF LEFT HAND: ICD-10-CM

## 2023-04-14 DIAGNOSIS — M19.022 PRIMARY OSTEOARTHRITIS OF LEFT ELBOW: ICD-10-CM

## 2023-04-14 DIAGNOSIS — G89.29 CHRONIC PAIN OF LEFT ELBOW: ICD-10-CM

## 2023-04-14 DIAGNOSIS — M62.542 MUSCLE WASTING AND ATROPHY, NOT ELSEWHERE CLASSIFIED, LEFT HAND: ICD-10-CM

## 2023-04-14 DIAGNOSIS — G56.22 ULNAR NEUROPATHY OF LEFT UPPER EXTREMITY: ICD-10-CM

## 2023-04-14 DIAGNOSIS — R20.2 NUMBNESS AND TINGLING IN LEFT HAND: Primary | ICD-10-CM

## 2023-04-14 PROCEDURE — 99214 OFFICE O/P EST MOD 30 MIN: CPT | Performed by: NURSE PRACTITIONER

## 2023-04-14 PROCEDURE — 1159F MED LIST DOCD IN RCRD: CPT | Performed by: NURSE PRACTITIONER

## 2023-04-14 PROCEDURE — 1160F RVW MEDS BY RX/DR IN RCRD: CPT | Performed by: NURSE PRACTITIONER

## 2023-04-14 RX ORDER — PREDNISONE 10 MG/1
20 TABLET ORAL DAILY
Qty: 15 TABLET | Refills: 0 | Status: SHIPPED | OUTPATIENT
Start: 2023-04-14

## 2023-04-14 NOTE — PROGRESS NOTES
"Brian Ridley is a 53 y.o. male returns for     Chief Complaint   Patient presents with   • Left Hand - Follow-up, Pain, Numbness     HISTORY OF PRESENT ILLNESS: Patient presents to office for follow-up of chronic left hand pain.  Patient was initially evaluated by orthopedics on 6/16/2021 and at that time had trigger finger pain/symptoms affecting the bilateral middle fingers.  Patient was treated with localized injections of steroid at that time, which he states today did offer some temporary relief but then the pain/symptoms returned eventually.  The patient has not been seen in office since that date.  Patient continues to have pain/locking/catching of the left middle finger but he also has new complaints today of pain in his left elbow that radiates into his left hand and associated numbness of his left fifth finger.  Patient also states he has noticed visible differences in his left hand and muscle wasting in recent months.  Patient was evaluated recently by his primary care physician, Dr. Benitez, on 3/30/2023 with x-rays performed of the cervical spine and left elbow and he was referred to orthopedics for further evaluation and management.  Patient reports increased pain at nighttime that disrupts his sleep.  Patient also reports progressive weakness of his left hand/ strength.  Patient denies any known injuries to his left upper extremity.  Current pain scale is 8/10     CONCURRENT MEDICAL HISTORY:    The following portions of the patient's history were reviewed and updated as appropriate: allergies, current medications, past family history, past medical history, past social history, past surgical history and problem list.     ROS  No fevers or chills.  No chest pain or shortness of air.  No GI or  disturbances. Left hand pain/numbness. Left middle finger pain/locking/catching.     PHYSICAL EXAMINATION:       Ht 177.8 cm (70\")   Wt 100 kg (221 lb)   BMI 31.71 kg/m²     Physical Exam  Vitals " reviewed.   Constitutional:       General: He is not in acute distress.     Appearance: He is well-developed. He is not ill-appearing.   HENT:      Head: Normocephalic.   Pulmonary:      Effort: Pulmonary effort is normal. No respiratory distress.   Abdominal:      General: There is no distension.      Palpations: Abdomen is soft.   Musculoskeletal:         General: Tenderness (Left elbow, Left hand/middle finger) present. No swelling.   Skin:     General: Skin is warm and dry.      Capillary Refill: Capillary refill takes less than 2 seconds.      Findings: No erythema.   Neurological:      Mental Status: He is alert and oriented to person, place, and time.      GCS: GCS eye subscore is 4. GCS verbal subscore is 5. GCS motor subscore is 6.      Sensory: Sensory deficit (Left hand/5th finger) present.   Psychiatric:         Speech: Speech normal.         Behavior: Behavior normal.         Thought Content: Thought content normal.         Judgment: Judgment normal.         GAIT:     [x]  Normal  []  Antalgic    Assistive device: [x]  None  []  Walker     []  Crutches  []  Cane     []  Wheelchair  []  Stretcher    Left Hand Exam     Tenderness   The patient is experiencing tenderness in the palmar area (Middle finger).     Range of Motion   Wrist   Extension: normal   Flexion: normal   Pronation: normal   Supination: normal   Hand   MP Middle: abnormal   PIP Middle: abnormal   DIP Middle: abnormal     Muscle Strength   Wrist extension: 4/5   Wrist flexion: 4/5   :  4/5     Other   Erythema: absent  Sensation: decreased (5th finger)  Pulse: present    Comments:  Pain and mild limitations with range of motion of the left middle finger.  Active triggering noted with flexion and extension of the middle finger.  Tenderness to palpation in the palmar hand at the base of the middle finger at the flexor tendon with a small, tender nodule palpable.  No swelling appreciated. No erythema.  No warmth. No signs of infection  noted.  Additionally, there is atrophy and muscle wasting in the radial aspect of the dorsal hand, which is a new finding from prior exam.  Capillary refill is less than 3 seconds.       Left Elbow Exam     Tests   Tinel's sign (cubital tunnel): positive    Other   Erythema: absent            XR Spine Cervical Complete 4 or 5 View    Result Date: 4/13/2023  Narrative: Comparison: None FINDINGS: No acute fracture.  Vertebral body heights are maintained.  There is a grade 1 anterolisthesis of C6 on C7.  Facet joints and posterior elements are intact. Mild multilevel degenerative disc disease and facet arthropathy noted in the lower cervical spine.  There may be mild left neural foraminal narrowing at C5-C6.  Visualized base of dens is intact and lateral masses of C1 are symmetric on C2.     XR elbow 3+ vw left    Result Date: 4/13/2023  Narrative: Comparison: None FINDINGS: No acute fracture or dislocation.  Degenerative changes noted about the elbow joint.  Soft tissues appear grossly unremarkable.    XR Hand 3+ View Left    Result Date: 4/14/2023  Narrative: COMPARISON: Left hand, 04/19/2021. HISTORY: Left hand pain. VIEWS: Three view left hand. FINDINGS: No acute fracture or subluxation.  Normal alignment.  No significant degenerative changes. On lateral view, there is a radiopaque density projected over the distal forearm, which could potentially represent a radiopaque foreign body.     Impression: Impression: 1.  No acute osseous abnormality. 2.  Small radiopaque density projected over the distal forearm seen only on the lateral view, potentially representing radiopaque foreign body.        ASSESSMENT:    Diagnoses and all orders for this visit:    Numbness and tingling in left hand  -     EMG & Nerve Conduction Test; Future    Degenerative disc disease, cervical    Chronic pain of left elbow    Primary osteoarthritis of left elbow    Ulnar neuropathy of left upper extremity  -     EMG & Nerve Conduction Test;  Future    Cubital tunnel syndrome on left  -     EMG & Nerve Conduction Test; Future    Trigger middle finger of left hand    Muscle wasting and atrophy, not elsewhere classified, left hand  -     EMG & Nerve Conduction Test; Future    Other orders  -     predniSONE (DELTASONE) 10 MG tablet; Take 2 tablets by mouth Daily. X 5 days then 1 tablet Daily X 5 days    PLAN    X-rays of the left hand performed in office today and reviewed with no acute findings noted.  The patient has some mild degenerative changes at various interphalangeal joints throughout the hand but no significant degenerative changes are noted throughout the hand or wrist.  I have also independently reviewed the x-rays of the left elbow recently performed on 3/30/2023 as ordered by his PCP with moderate osteoarthritic/degenerative changes noted in the left elbow joint.  There is no evidence of joint effusion in the left elbow.  I have also independently reviewed the x-ray images of the cervical spine performed on 3/30/2023 as ordered by his PCP with multilevel degenerative changes noted, primarily affecting the lower levels of the cervical spine with evidence of degenerative disc disease and facet arthropathy, primarily affecting C5-C6 and C6-C7.  There is also a grade 1 anterolisthesis noted at C6 in relation to C7.     The patient has not been seen in office since June 2021 and was treated for trigger finger symptoms affecting the bilateral middle fingers and he was given localized injections of steroid at that time, which offered some temporary relief but then the pain/symptoms returned. Patient continues to have trigger finger symptoms in his bilateral middle fingers.  He has new complaints today of left elbow pain, some of which we discussed is likely related to the moderate osteoarthritic changes seen in his left elbow joint on x-ray imaging.  However, he also complains of pain that radiates into his left hand with numbness that affects his  left fifth finger.  He has also noticed in recent months a change in the appearance of his left hand and he is noted to have intrinsic muscle wasting.  We discussed the possibility of ulnar nerve neuropathy/cubital tunnel syndrome.  We also discussed the possibility of cervical radiculopathy and/or carpal tunnel syndrome.  Recommend proceeding with EMG-NCS testing for further evaluation and to help with a more definitive diagnosis.  We discussed and have offered a trial of an intra-articular injection of steroid into his left elbow joint.  We also discussed and I have offered an intramuscular injection of steroid to see if this helps with his symptoms.  Patient declines both of these insights fear of needles.  A tapered course of prednisone is prescribed for management of pain/inflammation.     Recommend rest and activity modification as needed during times of increased pain with avoidance of over exertional use with his hands/upper extremities that involve excessive gripping, lifting and repetitive motions or any activities that exacerbate his pain/symptoms.  Recommend ice therapy to the left elbow and/or left hand as needed to minimize pain/swelling/inflammation.  Recommend Tylenol, Aleve or Ibuprofen as needed for pain/discomfort.    Follow-up after EMG-NCS to discuss results and further treatment recommendations.  Anticipate surgical consult for suspected cubital tunnel syndrome as well as trigger finger release of the left middle finger since he has already tried a localized injection of steroid in the symptoms returned.     Time spent of a minimum of 30 minutes including the face to face evaluation, reviewing of medical history and prior medial records, reviewing of diagnostic studies, ordering additional tests, prescription drug management, documentation, patient education and coordination of care.     EMR Dragon/Transciption Disclaimer: Some of this note may be an electronic transcription/translation of  spoken language to printed text using the Dragon Dictation System.     Return for follow up after EMG-NCS.        This document has been electronically signed by SOHAIL Lan on April 19, 2023 14:41 CDT      SOHAIL Lan

## 2023-04-18 PROBLEM — M62.542 MUSCLE WASTING AND ATROPHY, NOT ELSEWHERE CLASSIFIED, LEFT HAND: Status: ACTIVE | Noted: 2023-04-18

## 2023-06-06 ENCOUNTER — TELEPHONE (OUTPATIENT)
Dept: FAMILY MEDICINE CLINIC | Facility: CLINIC | Age: 53
End: 2023-06-06
Payer: COMMERCIAL

## 2023-06-06 NOTE — TELEPHONE ENCOUNTER
Ms. Ridley called and said that the Cholesterol medicine that you gave Mr. Ridley is making him dizzy/have diarrhea/and his stomach hurts.  She wanted to know if you could give him something else or if he needs to come in?

## 2023-07-18 PROBLEM — M79.642 PAIN OF LEFT HAND: Status: ACTIVE | Noted: 2023-07-18

## 2023-07-18 PROBLEM — G56.02 CARPAL TUNNEL SYNDROME OF LEFT WRIST: Status: ACTIVE | Noted: 2023-07-18

## 2023-08-01 ENCOUNTER — OFFICE VISIT (OUTPATIENT)
Dept: ORTHOPEDIC SURGERY | Facility: CLINIC | Age: 53
End: 2023-08-01
Payer: COMMERCIAL

## 2023-08-01 VITALS — WEIGHT: 216 LBS | BODY MASS INDEX: 30.92 KG/M2 | HEIGHT: 70 IN

## 2023-08-01 DIAGNOSIS — R20.0 NUMBNESS AND TINGLING IN LEFT HAND: ICD-10-CM

## 2023-08-01 DIAGNOSIS — M19.022 PRIMARY OSTEOARTHRITIS OF LEFT ELBOW: ICD-10-CM

## 2023-08-01 DIAGNOSIS — G56.02 CARPAL TUNNEL SYNDROME OF LEFT WRIST: ICD-10-CM

## 2023-08-01 DIAGNOSIS — G56.22 ULNAR NEUROPATHY OF LEFT UPPER EXTREMITY: Primary | ICD-10-CM

## 2023-08-01 DIAGNOSIS — G56.22 CUBITAL TUNNEL SYNDROME ON LEFT: ICD-10-CM

## 2023-08-01 DIAGNOSIS — R20.2 NUMBNESS AND TINGLING IN LEFT HAND: ICD-10-CM

## 2023-08-01 DIAGNOSIS — M25.522 CHRONIC PAIN OF LEFT ELBOW: ICD-10-CM

## 2023-08-01 DIAGNOSIS — G89.29 CHRONIC PAIN OF LEFT ELBOW: ICD-10-CM

## 2023-08-01 PROCEDURE — 1160F RVW MEDS BY RX/DR IN RCRD: CPT | Performed by: ORTHOPAEDIC SURGERY

## 2023-08-01 PROCEDURE — 1159F MED LIST DOCD IN RCRD: CPT | Performed by: ORTHOPAEDIC SURGERY

## 2023-08-01 PROCEDURE — 99214 OFFICE O/P EST MOD 30 MIN: CPT | Performed by: ORTHOPAEDIC SURGERY

## 2023-08-03 PROBLEM — R20.2 NUMBNESS AND TINGLING IN LEFT HAND: Status: ACTIVE | Noted: 2023-08-03

## 2023-08-03 PROBLEM — R20.0 NUMBNESS AND TINGLING IN LEFT HAND: Status: ACTIVE | Noted: 2023-08-03

## 2023-09-14 ENCOUNTER — ANESTHESIA EVENT (OUTPATIENT)
Dept: PERIOP | Facility: HOSPITAL | Age: 53
End: 2023-09-14
Payer: COMMERCIAL

## 2023-09-15 ENCOUNTER — HOSPITAL ENCOUNTER (OUTPATIENT)
Facility: HOSPITAL | Age: 53
Setting detail: HOSPITAL OUTPATIENT SURGERY
Discharge: HOME | End: 2023-09-15
Attending: ORTHOPAEDIC SURGERY | Admitting: ORTHOPAEDIC SURGERY
Payer: COMMERCIAL

## 2023-09-15 ENCOUNTER — ANESTHESIA (OUTPATIENT)
Dept: PERIOP | Facility: HOSPITAL | Age: 53
End: 2023-09-15
Payer: COMMERCIAL

## 2023-09-15 VITALS
TEMPERATURE: 97 F | SYSTOLIC BLOOD PRESSURE: 144 MMHG | BODY MASS INDEX: 29.75 KG/M2 | WEIGHT: 212.52 LBS | HEART RATE: 67 BPM | HEIGHT: 71 IN | OXYGEN SATURATION: 98 % | RESPIRATION RATE: 18 BRPM | DIASTOLIC BLOOD PRESSURE: 81 MMHG

## 2023-09-15 DIAGNOSIS — G56.22 CUBITAL TUNNEL SYNDROME ON LEFT: Primary | ICD-10-CM

## 2023-09-15 DIAGNOSIS — G56.02 CARPAL TUNNEL SYNDROME OF LEFT WRIST: ICD-10-CM

## 2023-09-15 DIAGNOSIS — G89.29 CHRONIC PAIN OF LEFT ELBOW: ICD-10-CM

## 2023-09-15 DIAGNOSIS — M19.022 PRIMARY OSTEOARTHRITIS OF LEFT ELBOW: ICD-10-CM

## 2023-09-15 DIAGNOSIS — M25.522 CHRONIC PAIN OF LEFT ELBOW: ICD-10-CM

## 2023-09-15 DIAGNOSIS — R20.2 NUMBNESS AND TINGLING IN LEFT HAND: ICD-10-CM

## 2023-09-15 DIAGNOSIS — G56.22 ULNAR NEUROPATHY OF LEFT UPPER EXTREMITY: ICD-10-CM

## 2023-09-15 DIAGNOSIS — R20.0 NUMBNESS AND TINGLING IN LEFT HAND: ICD-10-CM

## 2023-09-15 PROCEDURE — 25010000002 ONDANSETRON PER 1 MG

## 2023-09-15 PROCEDURE — 64721 CARPAL TUNNEL SURGERY: CPT | Performed by: ORTHOPAEDIC SURGERY

## 2023-09-15 PROCEDURE — 25010000002 BUPIVACAINE (PF) 0.25 % SOLUTION: Performed by: ORTHOPAEDIC SURGERY

## 2023-09-15 PROCEDURE — 25010000002 PROPOFOL 200 MG/20ML EMULSION

## 2023-09-15 PROCEDURE — 25010000002 PHENYLEPHRINE 10 MG/ML SOLUTION

## 2023-09-15 PROCEDURE — S0260 H&P FOR SURGERY: HCPCS | Performed by: ORTHOPAEDIC SURGERY

## 2023-09-15 PROCEDURE — 25010000002 FENTANYL CITRATE (PF) 100 MCG/2ML SOLUTION

## 2023-09-15 PROCEDURE — 93010 ELECTROCARDIOGRAM REPORT: CPT | Performed by: INTERNAL MEDICINE

## 2023-09-15 PROCEDURE — 25010000002 DEXAMETHASONE PER 1 MG

## 2023-09-15 PROCEDURE — 93005 ELECTROCARDIOGRAM TRACING: CPT

## 2023-09-15 PROCEDURE — 25010000002 MIDAZOLAM PER 1 MG

## 2023-09-15 RX ORDER — BUPIVACAINE HCL/0.9 % NACL/PF 0.1 %
2 PLASTIC BAG, INJECTION (ML) EPIDURAL ONCE
Status: COMPLETED | OUTPATIENT
Start: 2023-09-15 | End: 2023-09-15

## 2023-09-15 RX ORDER — PHENYLEPHRINE HYDROCHLORIDE 10 MG/ML
INJECTION INTRAVENOUS AS NEEDED
Status: DISCONTINUED | OUTPATIENT
Start: 2023-09-15 | End: 2023-09-15 | Stop reason: SURG

## 2023-09-15 RX ORDER — MIDAZOLAM HYDROCHLORIDE 1 MG/ML
INJECTION INTRAMUSCULAR; INTRAVENOUS AS NEEDED
Status: DISCONTINUED | OUTPATIENT
Start: 2023-09-15 | End: 2023-09-15 | Stop reason: SURG

## 2023-09-15 RX ORDER — PROPOFOL 10 MG/ML
INJECTION, EMULSION INTRAVENOUS AS NEEDED
Status: DISCONTINUED | OUTPATIENT
Start: 2023-09-15 | End: 2023-09-15 | Stop reason: SURG

## 2023-09-15 RX ORDER — MEPERIDINE HYDROCHLORIDE 25 MG/ML
12.5 INJECTION INTRAMUSCULAR; INTRAVENOUS; SUBCUTANEOUS
Status: DISCONTINUED | OUTPATIENT
Start: 2023-09-15 | End: 2023-09-15 | Stop reason: HOSPADM

## 2023-09-15 RX ORDER — FLUMAZENIL 0.1 MG/ML
0.2 INJECTION INTRAVENOUS AS NEEDED
Status: DISCONTINUED | OUTPATIENT
Start: 2023-09-15 | End: 2023-09-15 | Stop reason: HOSPADM

## 2023-09-15 RX ORDER — HYDROCODONE BITARTRATE AND ACETAMINOPHEN 7.5; 325 MG/1; MG/1
1 TABLET ORAL EVERY 6 HOURS PRN
Qty: 20 TABLET | Refills: 0 | Status: SHIPPED | OUTPATIENT
Start: 2023-09-15

## 2023-09-15 RX ORDER — SODIUM CHLORIDE, SODIUM GLUCONATE, SODIUM ACETATE, POTASSIUM CHLORIDE AND MAGNESIUM CHLORIDE 526; 502; 368; 37; 30 MG/100ML; MG/100ML; MG/100ML; MG/100ML; MG/100ML
1000 INJECTION, SOLUTION INTRAVENOUS CONTINUOUS PRN
Status: DISCONTINUED | OUTPATIENT
Start: 2023-09-15 | End: 2023-09-15 | Stop reason: HOSPADM

## 2023-09-15 RX ORDER — ONDANSETRON 2 MG/ML
INJECTION INTRAMUSCULAR; INTRAVENOUS AS NEEDED
Status: DISCONTINUED | OUTPATIENT
Start: 2023-09-15 | End: 2023-09-15 | Stop reason: SURG

## 2023-09-15 RX ORDER — BUPIVACAINE HYDROCHLORIDE 2.5 MG/ML
INJECTION, SOLUTION EPIDURAL; INFILTRATION; INTRACAUDAL AS NEEDED
Status: DISCONTINUED | OUTPATIENT
Start: 2023-09-15 | End: 2023-09-15 | Stop reason: HOSPADM

## 2023-09-15 RX ORDER — DEXAMETHASONE SODIUM PHOSPHATE 4 MG/ML
INJECTION, SOLUTION INTRA-ARTICULAR; INTRALESIONAL; INTRAMUSCULAR; INTRAVENOUS; SOFT TISSUE AS NEEDED
Status: DISCONTINUED | OUTPATIENT
Start: 2023-09-15 | End: 2023-09-15 | Stop reason: SURG

## 2023-09-15 RX ORDER — ONDANSETRON 4 MG/1
4 TABLET, FILM COATED ORAL ONCE AS NEEDED
Status: DISCONTINUED | OUTPATIENT
Start: 2023-09-15 | End: 2023-09-15 | Stop reason: HOSPADM

## 2023-09-15 RX ORDER — ACETAMINOPHEN 325 MG/1
650 TABLET ORAL ONCE AS NEEDED
Status: DISCONTINUED | OUTPATIENT
Start: 2023-09-15 | End: 2023-09-15 | Stop reason: HOSPADM

## 2023-09-15 RX ORDER — DIPHENHYDRAMINE HYDROCHLORIDE 50 MG/ML
12.5 INJECTION INTRAMUSCULAR; INTRAVENOUS
Status: DISCONTINUED | OUTPATIENT
Start: 2023-09-15 | End: 2023-09-15 | Stop reason: HOSPADM

## 2023-09-15 RX ORDER — PROMETHAZINE HYDROCHLORIDE 25 MG/1
25 SUPPOSITORY RECTAL ONCE AS NEEDED
Status: DISCONTINUED | OUTPATIENT
Start: 2023-09-15 | End: 2023-09-15 | Stop reason: HOSPADM

## 2023-09-15 RX ORDER — HYDROCODONE BITARTRATE AND ACETAMINOPHEN 7.5; 325 MG/1; MG/1
1 TABLET ORAL ONCE AS NEEDED
Status: DISCONTINUED | OUTPATIENT
Start: 2023-09-15 | End: 2023-09-15 | Stop reason: HOSPADM

## 2023-09-15 RX ORDER — FENTANYL CITRATE 50 UG/ML
INJECTION, SOLUTION INTRAMUSCULAR; INTRAVENOUS AS NEEDED
Status: DISCONTINUED | OUTPATIENT
Start: 2023-09-15 | End: 2023-09-15 | Stop reason: SURG

## 2023-09-15 RX ORDER — LIDOCAINE HYDROCHLORIDE 10 MG/ML
INJECTION, SOLUTION EPIDURAL; INFILTRATION; INTRACAUDAL; PERINEURAL AS NEEDED
Status: DISCONTINUED | OUTPATIENT
Start: 2023-09-15 | End: 2023-09-15 | Stop reason: SURG

## 2023-09-15 RX ORDER — EPHEDRINE SULFATE 50 MG/ML
5 INJECTION, SOLUTION INTRAVENOUS ONCE AS NEEDED
Status: DISCONTINUED | OUTPATIENT
Start: 2023-09-15 | End: 2023-09-15 | Stop reason: HOSPADM

## 2023-09-15 RX ORDER — ONDANSETRON 2 MG/ML
4 INJECTION INTRAMUSCULAR; INTRAVENOUS ONCE AS NEEDED
Status: DISCONTINUED | OUTPATIENT
Start: 2023-09-15 | End: 2023-09-15 | Stop reason: HOSPADM

## 2023-09-15 RX ORDER — NALOXONE HCL 0.4 MG/ML
0.4 VIAL (ML) INJECTION AS NEEDED
Status: DISCONTINUED | OUTPATIENT
Start: 2023-09-15 | End: 2023-09-15 | Stop reason: HOSPADM

## 2023-09-15 RX ORDER — PROMETHAZINE HYDROCHLORIDE 25 MG/1
25 TABLET ORAL ONCE AS NEEDED
Status: DISCONTINUED | OUTPATIENT
Start: 2023-09-15 | End: 2023-09-15 | Stop reason: HOSPADM

## 2023-09-15 RX ADMIN — Medication 2 G: at 11:53

## 2023-09-15 RX ADMIN — FENTANYL CITRATE 50 MCG: 50 INJECTION, SOLUTION INTRAMUSCULAR; INTRAVENOUS at 11:44

## 2023-09-15 RX ADMIN — ONDANSETRON 4 MG: 2 INJECTION INTRAMUSCULAR; INTRAVENOUS at 12:44

## 2023-09-15 RX ADMIN — SODIUM CHLORIDE, SODIUM GLUCONATE, SODIUM ACETATE, POTASSIUM CHLORIDE AND MAGNESIUM CHLORIDE 1000 ML: 526; 502; 368; 37; 30 INJECTION, SOLUTION INTRAVENOUS at 09:58

## 2023-09-15 RX ADMIN — MIDAZOLAM HYDROCHLORIDE 2 MG: 1 INJECTION, SOLUTION INTRAMUSCULAR; INTRAVENOUS at 11:41

## 2023-09-15 RX ADMIN — PROPOFOL 50 MG: 10 INJECTION, EMULSION INTRAVENOUS at 11:47

## 2023-09-15 RX ADMIN — LIDOCAINE HYDROCHLORIDE 50 MG: 10 INJECTION, SOLUTION EPIDURAL; INFILTRATION; INTRACAUDAL; PERINEURAL at 11:46

## 2023-09-15 RX ADMIN — PROPOFOL 100 MG: 10 INJECTION, EMULSION INTRAVENOUS at 11:46

## 2023-09-15 RX ADMIN — DEXAMETHASONE SODIUM PHOSPHATE 4 MG: 4 INJECTION, SOLUTION INTRAMUSCULAR; INTRAVENOUS at 11:57

## 2023-09-15 RX ADMIN — FENTANYL CITRATE 25 MCG: 50 INJECTION, SOLUTION INTRAMUSCULAR; INTRAVENOUS at 12:34

## 2023-09-15 RX ADMIN — PHENYLEPHRINE HYDROCHLORIDE 50 MCG: 10 INJECTION, SOLUTION INTRAVENOUS at 12:52

## 2023-09-15 NOTE — H&P
[facility]   HISTORY AND PHYSICAL    Patient Name: Brian Ridley  : 1970  MRN: [id number]  Primary Care Physician:  Anthony Benitez MD  Date of admission: 9/15/2023    Brian Ridley is a 53 y.o. male returns for         Chief Complaint   Patient presents with    Left Hand - Follow-up    Left Elbow - Follow-up         HISTORY OF PRESENT ILLNESS:  Patient is here to discuss possible surgery on the left wrist and elbow.  Patient reports continued pain in the left elbow and hand.  He has burning and tingling pain as well as numbness and tingling.   He has difficulty with  and with dropping things as well.  He has tried activity modification as well as trying bracing.  He has difficulty with activities of daily living and is unhappy with his quality of life.  He wishes to discuss definitive treatment options.        CONCURRENT MEDICAL HISTORY:     Medical History   History reviewed. No pertinent past medical history.        No Known Allergies          Current Outpatient Medications on File Prior to Visit   Medication Sig    [DISCONTINUED] pravastatin (Pravachol) 40 MG tablet Take 1 tablet by mouth Every Night. (Patient not taking: Reported on 2023)      No current facility-administered medications on file prior to visit.         Surgical History   History reviewed. No pertinent surgical history.        History reviewed. No pertinent family history.     Social History   Social History            Socioeconomic History    Marital status: Single   Tobacco Use    Smoking status: Every Day       Packs/day: 1.00       Years: 39.00       Pack years: 39.00       Types: Cigarettes       Start date: 11/3/1981       Passive exposure: Current    Smokeless tobacco: Current       Types: Chew   Substance and Sexual Activity    Alcohol use: Not Currently    Drug use: Not Currently    Sexual activity: Defer                  ROS    No fevers or chills.  No chest pain or shortness of air.  No GI or   disturbances.  Other than numbness and tingling in the left arm, all other systems reviewed is negative.     PHYSICAL EXAMINATION:        Vitals:    09/15/23 0943   BP: 150/83   Pulse: 67   Resp: 18   Temp: 97.4 °F (36.3 °C)   SpO2: 99%          Physical Exam  Vitals reviewed.   Constitutional:       General: He is not in acute distress.     Appearance: Normal appearance. He is well-developed.   Cardiovascular:      Rate and Rhythm: Normal rate and regular rhythm.      Heart sounds: Normal heart sounds.   Pulmonary:      Effort: Pulmonary effort is normal.      Breath sounds: Normal breath sounds.   Abdominal:      General: Bowel sounds are normal.      Palpations: Abdomen is soft.   Neurological:      Mental Status: He is alert and oriented to person, place, and time.   Psychiatric:         Behavior: Behavior normal.         Thought Content: Thought content normal.         Judgment: Judgment normal.         GAIT:                           [x]  Normal                   []  Antalgic     Assistive device:         [x]  None                      []  Walker                                      []  Crutches                []  Cane                                      []  Wheelchair                        []  Stretcher     Left Hand Exam      Comments:  Good fist formation.  Good finger motion.  He does have a tender nodule along the volar aspect of the middle finger MCP.  Positive carpal compression test and positive Tinel sign over the carpal tunnel.  Good capillary refill.  Good distal pulses and sensation grossly.        Left Elbow Exam      Comments:  Tender along the cubital tunnel.  Tenderness on the medial aspect of the elbow.  Positive Tinel sign and positive cubital compression test.  Good elbow motion.  No erythema.                         Narrative & Impression   COMPARISON:  Left hand, 04/19/2021.     HISTORY:  Left hand pain.     VIEWS:  Three view left hand.  4/14/23     FINDINGS:  No acute fracture or  subluxation.  Normal alignment.  No significant  degenerative changes.     On lateral view, there is a radiopaque density projected over the distal  forearm, which could potentially represent a radiopaque foreign body.     IMPRESSION:  Impression:  1.  No acute osseous abnormality.     2.  Small radiopaque density projected over the distal forearm seen only on the  lateral view, potentially representing radiopaque foreign body.            Xray LEFT elbow 4/13/23  Narrative & Impression   Comparison:  None     FINDINGS:  No acute fracture or dislocation.  Degenerative changes noted about the elbow  joint.  Soft tissues appear grossly unremarkable.         ASSESSMENT:     Diagnoses and all orders for this visit:     Ulnar neuropathy of left upper extremity  -     Case Request; Standing  -     ethyl alcohol 62 % 2 each  -     ceFAZolin (ANCEF) 2 g in sodium chloride 0.9 % 100 mL IVPB  -     Case Request     Cubital tunnel syndrome on left  -     Case Request; Standing  -     ethyl alcohol 62 % 2 each  -     ceFAZolin (ANCEF) 2 g in sodium chloride 0.9 % 100 mL IVPB  -     Case Request     Chronic pain of left elbow  -     Case Request; Standing  -     ethyl alcohol 62 % 2 each  -     ceFAZolin (ANCEF) 2 g in sodium chloride 0.9 % 100 mL IVPB  -     Case Request     Primary osteoarthritis of left elbow  -     Case Request; Standing  -     ethyl alcohol 62 % 2 each  -     ceFAZolin (ANCEF) 2 g in sodium chloride 0.9 % 100 mL IVPB  -     Case Request     Carpal tunnel syndrome of left wrist  -     Case Request; Standing  -     ethyl alcohol 62 % 2 each  -     ceFAZolin (ANCEF) 2 g in sodium chloride 0.9 % 100 mL IVPB  -     Case Request     Numbness and tingling in left hand  -     Case Request; Standing  -     ethyl alcohol 62 % 2 each  -     ceFAZolin (ANCEF) 2 g in sodium chloride 0.9 % 100 mL IVPB  -     Case Request     Other orders  -     Follow Anesthesia Guidelines / Protocol; Future  -     Follow Anesthesia  Guidelines / Protocol; Standing  -     Verify NPO Status; Standing  -     Obtain informed consent (if not collected inpatient or PAT); Standing  -     Provide Instructions to Patient Regarding NPO Status; Future  -     Provide Patient With ERAS Hydration Instructions              PLAN     Long discussion was had with the patient regarding further treatment options.  He has symptoms, exam finding, and EMG testing supportive of cubital tunnel and carpal tunnel syndrome involving the left arm.  Patient is unhappy with his quality of life and wishes to discuss definitive treatment options.  He has tried nonoperative treatment with activity modification, brace wear, and sleeping in an extension position.     The patient voiced understanding of the risks, benefits, and alternative forms of treatment that were discussed and the patient consents to proceed with surgery.  All risks, benefits and alternatives were discussed.  Risks include, but not exclusive to anesthetic complications, including death, MI, CVA, infection, bleeding DVT, fracture, residual pain and need for future surgery.     This discussion was held with the patient by Delgado Deleon MD and all questions were answered.     Plan carpal tunnel release with cubital tunnel release/ulnar nerve transposition LEFT arm.     We discussed monitoring the trigger finger and probable trigger release in office at some point postoperatively.  However we discussed the possibility that the trigger finger may improve with the extended period of time and immobilization and off work postoperatively.     Return for Post-operative eval.     Delgado Deleon MD

## 2023-09-15 NOTE — ANESTHESIA POSTPROCEDURE EVALUATION
Patient: Brian Ridley    Procedure Summary       Date: 09/15/23 Room / Location: [facility] OR [address] [place] [address] OR    Anesthesia Start: 1143 Anesthesia Stop: 1313    Procedure: CARPAL TUNNEL RELEASE WITH CUBITAL TUNNEL RELEASE / ULNAR NERVE TRANSPOSITION  LEFT ARM (Left: Wrist) Diagnosis:       Ulnar neuropathy of left upper extremity      Cubital tunnel syndrome on left      Chronic pain of left elbow      Primary osteoarthritis of left elbow      Carpal tunnel syndrome of left wrist      Numbness and tingling in left hand      (Ulnar neuropathy of left upper extremity [G56.22])      (Cubital tunnel syndrome on left [G56.22])      (Chronic pain of left elbow [M25.522, G89.29])      (Primary osteoarthritis of left elbow [M19.022])      (Carpal tunnel syndrome of left wrist [G56.02])      (Numbness and tingling in left hand [R20.0, R20.2])    Surgeons: Delgado Deleon MD Provider: Selina Quezada CRNA    Anesthesia Type: general ASA Status: 3            Anesthesia Type: general    Vitals  No vitals data found for the desired time range.          Post Anesthesia Care and Evaluation    Patient location during evaluation: PHASE II  Patient participation: complete - patient cannot participate  Level of consciousness: sleepy but conscious  Pain score: 0  Pain management: adequate    Airway patency: patent  Anesthetic complications: No anesthetic complications  PONV Status: none  Cardiovascular status: acceptable  Respiratory status: acceptable  Hydration status: acceptable    Comments: Vital signs:    HR: 74  BP: 153/89  SpO2: 97  RR: 16  Temp: 97.7  No anesthesia care post op

## 2023-09-15 NOTE — ANESTHESIA PREPROCEDURE EVALUATION
Anesthesia Evaluation     Patient summary reviewed and Nursing notes reviewed   NPO Solid Status: > 8 hours  NPO Liquid Status: < 2 hours           Airway   Mallampati: III  TM distance: >3 FB  Neck ROM: full  Small opening and Possible difficult intubation  Dental    (+) edentulous    Comment: Edentulous on top remaining group home in poor repair.     Pulmonary     breath sounds clear to auscultation  (+) a smoker Current Smoked day of surgery,  (-) asthma, shortness of breath, sleep apnea, rhonchi, decreased breath sounds, wheezes, rales  Cardiovascular   Exercise tolerance: good (4-7 METS)    Rhythm: regular  Rate: normal    (+) hyperlipidemia  (-) hypertension, past MI, dysrhythmias, angina, murmur, cardiac stents, CABG, DVT      Neuro/Psych  (+) numbness  (-) seizures, TIA, CVA  GI/Hepatic/Renal/Endo    (+) obesity, GERD well controlled  (-) liver disease, no renal disease, diabetes, no thyroid disorder    Musculoskeletal     Abdominal   (+) obese   Substance History   (-) alcohol use, drug use     OB/GYN          Other   arthritis,     ROS/Med Hx Other: Hx:GERD: controlled on Rolaids                   Anesthesia Plan    ASA 3     general     intravenous induction     Anesthetic plan, risks, benefits, and alternatives have been provided, discussed and informed consent has been obtained with: patient.      CODE STATUS:

## 2023-09-15 NOTE — DISCHARGE INSTR - APPOINTMENTS
[facility] to call you with an appointment for 2 weeks for 9/29/23

## 2023-09-15 NOTE — OP NOTE
CARPAL TUNNEL RELEASE WITH CUBITAL TUNNEL RELEASE  Procedure Note    Name:    Brian Ridley  YOB: 1970  Date of surgery:   9/15/2023    Pre-op Diagnosis:   Ulnar neuropathy of left upper extremity [G56.22]  Cubital tunnel syndrome on left [G56.22]  Chronic pain of left elbow [M25.522, G89.29]  Primary osteoarthritis of left elbow [M19.022]  Carpal tunnel syndrome of left wrist [G56.02]  Numbness and tingling in left hand [R20.0, R20.2]    Post-op Diagnosis:    Post-Op Diagnosis Codes:     * Ulnar neuropathy of left upper extremity [G56.22]     * Cubital tunnel syndrome on left [G56.22]     * Chronic pain of left elbow [M25.522, G89.29]     * Primary osteoarthritis of left elbow [M19.022]     * Carpal tunnel syndrome of left wrist [G56.02]     * Numbness and tingling in left hand [R20.0, R20.2]    Procedure:  Procedure(s):  CARPAL TUNNEL RELEASE WITH CUBITAL TUNNEL RELEASE / ULNAR NERVE TRANSPOSITION  LEFT ARM    Surgeon:  Surgeon(s):  Delgado Deleon MD    Assistant: Xiomara Cm CSA was responsible for performing the following activities: Retraction, Suction, Irrigation, Suturing, Closing, and Placing Dressing and their skilled assistance was necessary for the success of this case.     Anesthesia: General    Staff:   Circulator: Mell Martinez RN; Tatum Coffman RN  Scrub Person: Kimberly Chung  Assistant: Xiomara Cm CSA  Other: Dianna Robledo RN    Estimated Blood Loss: minimal        Specimens:                None      Drains: * No LDAs found *    Findings:  as below    Complications: None    IMPLANTS:   Nothing was implanted during the procedure      PROCEDURE:    Once consent was obtained and the patient was taken to the operating room and once adequate anesthesia was obtained then the Left upper extremity was prepped and draped in the standard surgical fashion.  a sterile tourniquet was applied to the Left upper extremity.  A surgical timeout was performed  and verified.    The tourniquet was then inflated to 250 mmHg.  An incision was made on the medial aspect of the elbow overlying the medial epicondyle.  Dissection was carried down to the fascia overlying the cubital tunnel.  The fascia was very carefully incised and the ulnar nerve was exposed. A Penrose drain was placed around the ulnar nerve to allow for mobilization.  A bipolar electrocautery was utilized to eliminate risk of electrocautery to the nerves.  The nerve was slowly mobilized proximally and then distally to allow for it to be transposed onto the anterior aspect of the medial epicondyle.  Once this was achieved then a 2-0 Vicryl stitch was placed securing a fascial sleeve maintaining position of the ulnar nerve anterior to the medial epicondyle.      The incision was then made on the base of the thenar eminence.  Dissection was carried down to the flexor retinaculum.  The flexor retinaculum was then incised with a #69 [place] blade.  The median nerve was identified and then a freer elevator was placed under the remainder of the flexor retinaculum proximally and then distally while it was transected.  This protected the median nerve during the remainder of the transection.  The nerve was then mobilized and a neurolysis was performed to ensure that there were no adhesions remaining on the nerve.    The tourniquet was then let down and hemostasis was obtained in both wounds.  The carpal tunnel wound was closed using interrupted nylon sutures.  Attention was then returned to the medial elbow and 2-0 Vicryl was used to close the soft tissue over the previous cubital tunnel.  The subcutaneous tissue was then closed using 2-0 Vicryl and the skin was closed using 4-0 nylon suture.  The wound was covered with Xeroform gauze and sterile dressings were applied with a well-padded elbow.  A posterior splint was then applied with the elbow in a proximally 90° of flexion.  All sponge and needle counts were reported as  correct prior to closure.  Patient was then awakened and taken to the recovery room in good condition, he tolerated the procedure well.      Delgado Deleon MD     Date: 9/15/2023  Time: 13:26 CDT

## 2023-09-15 NOTE — ANESTHESIA PROCEDURE NOTES
Airway  Urgency: elective    Date/Time: 9/15/2023 11:48 AM  End Time:9/15/2023 11:48 AM  Airway not difficult    General Information and Staff    Patient location during procedure: OR  CRNA/CAA: Selina Quezada CRNA  SRNA: Tosin Clayton SRNA  Indications and Patient Condition  Indications for airway management: airway protection    Preoxygenated: yes  MILS maintained throughout  Mask difficulty assessment: 0 - not attempted    Final Airway Details  Final airway type: supraglottic airway      Successful airway: I-gel  Size 4     Number of attempts at approach: 1  Assessment: lips, teeth, and gum same as pre-op

## 2023-09-16 LAB
QT INTERVAL: 398 MS
QTC INTERVAL: 432 MS

## 2023-09-29 ENCOUNTER — OFFICE VISIT (OUTPATIENT)
Dept: ORTHOPEDIC SURGERY | Facility: CLINIC | Age: 53
End: 2023-09-29
Payer: COMMERCIAL

## 2023-09-29 VITALS — HEIGHT: 71 IN | BODY MASS INDEX: 29.64 KG/M2

## 2023-09-29 DIAGNOSIS — G56.02 CARPAL TUNNEL SYNDROME OF LEFT WRIST: ICD-10-CM

## 2023-09-29 DIAGNOSIS — G56.22 CUBITAL TUNNEL SYNDROME ON LEFT: ICD-10-CM

## 2023-09-29 DIAGNOSIS — M62.542 MUSCLE WASTING AND ATROPHY, NOT ELSEWHERE CLASSIFIED, LEFT HAND: ICD-10-CM

## 2023-09-29 DIAGNOSIS — Z98.890 S/P CARPAL TUNNEL RELEASE: Primary | ICD-10-CM

## 2023-09-29 DIAGNOSIS — M25.522 CHRONIC PAIN OF LEFT ELBOW: ICD-10-CM

## 2023-09-29 DIAGNOSIS — R20.0 NUMBNESS AND TINGLING IN LEFT HAND: ICD-10-CM

## 2023-09-29 DIAGNOSIS — Z98.890 STATUS POST DECOMPRESSION OF ULNAR NERVE AT ELBOW: ICD-10-CM

## 2023-09-29 DIAGNOSIS — G89.29 CHRONIC PAIN OF LEFT ELBOW: ICD-10-CM

## 2023-09-29 DIAGNOSIS — M19.022 PRIMARY OSTEOARTHRITIS OF LEFT ELBOW: ICD-10-CM

## 2023-09-29 DIAGNOSIS — G56.22 ULNAR NEUROPATHY OF LEFT UPPER EXTREMITY: ICD-10-CM

## 2023-09-29 DIAGNOSIS — R20.2 NUMBNESS AND TINGLING IN LEFT HAND: ICD-10-CM

## 2023-09-29 PROCEDURE — 1160F RVW MEDS BY RX/DR IN RCRD: CPT | Performed by: NURSE PRACTITIONER

## 2023-09-29 PROCEDURE — 99024 POSTOP FOLLOW-UP VISIT: CPT | Performed by: NURSE PRACTITIONER

## 2023-09-29 PROCEDURE — 1159F MED LIST DOCD IN RCRD: CPT | Performed by: NURSE PRACTITIONER

## 2023-09-29 RX ORDER — HYDROCODONE BITARTRATE AND ACETAMINOPHEN 7.5; 325 MG/1; MG/1
1 TABLET ORAL EVERY 8 HOURS PRN
Qty: 30 TABLET | Refills: 0 | Status: SHIPPED | OUTPATIENT
Start: 2023-09-29

## 2023-09-29 NOTE — PROGRESS NOTES
Brian Ridley is a 53 y.o. male is s/p       Chief Complaint   Patient presents with    Left Wrist - Post-op     Left carpal tunnel release 9/15/2023    Left Elbow - Post-op     Left cubital tunnel release 9/15/2023    Suture / Staple Removal     HISTORY OF PRESENT ILLNESS: Patient presents to office for postoperative follow-up status post left carpal tunnel release and left cubital tunnel release/ulnar nerve transposition performed per Dr. Deleon on 9/15/2023.  Patient is doing well postoperatively with no unusual complaints or concerns noted.  Postoperative pain has been gradually improving.  Patient has continued to take Norco 7.5 mg as needed for postoperative pain control request a refill of this today.  Patient has continued with use of a posterior splint to his left arm as instructed.  Patient reports his numbness and tingling affecting the left hand has also been gradually improving.  Patient states the numbness and tingling in his left ring finger has already significantly improved.  He continues to have numbness and tingling affecting the left fifth finger, but he also states that this seems to be gradually improving as well.  Sutures are removed today in office. Current pain scale is 5/10.    No Known Allergies      Current Outpatient Medications:     HYDROcodone-acetaminophen (NORCO) 7.5-325 MG per tablet, Take 1 tablet by mouth Every 8 (Eight) Hours As Needed for Moderate Pain or Severe Pain (Pain)., Disp: 30 tablet, Rfl: 0    No fevers or chills.  No nausea or vomiting.  Left wrist pain.  Left elbow pain.  Numbness and tingling left hand/fingers.      PHYSICAL EXAMINATION:       Brian Ridley is a 53 y.o. male    Patient is awake and alert, answers questions appropriately and is in no apparent distress.    GAIT:     [x]  Normal  []  Antalgic    Assistive device: [x]  None  []  Walker     []  Crutches  []  Cane     []  Wheelchair []  Stretcher    Left Elbow Exam     Tenderness   Left elbow  tenderness location: Mild, diffuse (incisional)     Muscle Strength   Left elbow normal strength: deferred.    Other   Erythema: absent  Sensation: decreased (Left 4th/5th fingers)  Pulse: present    Comments:  Surgical incisions at the left elbow and left wrist are well approximated with no erythema and no drainage noted.  No signs of infection noted.  No significant swelling appreciated.  Range of motion and strength assessments are deferred at this time.             ASSESSMENT:    Diagnoses and all orders for this visit:    S/P carpal tunnel release    Status post decompression of ulnar nerve at elbow    Ulnar neuropathy of left upper extremity  -     HYDROcodone-acetaminophen (NORCO) 7.5-325 MG per tablet; Take 1 tablet by mouth Every 8 (Eight) Hours As Needed for Moderate Pain or Severe Pain (Pain).    Chronic pain of left elbow  -     HYDROcodone-acetaminophen (NORCO) 7.5-325 MG per tablet; Take 1 tablet by mouth Every 8 (Eight) Hours As Needed for Moderate Pain or Severe Pain (Pain).    Primary osteoarthritis of left elbow    Numbness and tingling in left hand  -     HYDROcodone-acetaminophen (NORCO) 7.5-325 MG per tablet; Take 1 tablet by mouth Every 8 (Eight) Hours As Needed for Moderate Pain or Severe Pain (Pain).    Muscle wasting and atrophy, not elsewhere classified, left hand    Carpal tunnel syndrome of left wrist  -     HYDROcodone-acetaminophen (NORCO) 7.5-325 MG per tablet; Take 1 tablet by mouth Every 8 (Eight) Hours As Needed for Moderate Pain or Severe Pain (Pain).    Cubital tunnel syndrome on left  -     HYDROcodone-acetaminophen (NORCO) 7.5-325 MG per tablet; Take 1 tablet by mouth Every 8 (Eight) Hours As Needed for Moderate Pain or Severe Pain (Pain).    PLAN    Patient is doing well postoperatively and progressing as expected.  Surgical incisions are healing as expected with no signs of infection noted.  Sutures are removed today in office and Steri-Strips placed.  Wound care instructions  reviewed, including care for Steri-Strips.  Patient is instructed to continue to monitor the incisions for any signs of infection including redness, increased swelling, increased tenderness, warmth and/or purulent drainage.  Patient is instructed to notify the office immediately if any such signs of infection are noted.    Patient has continued to wear posterior splint since surgery as directed.  We discussed that he needs to continue with the splint at all times for an additional 2 weeks (a total of 4 weeks).  However, the splint can be removed for bathing purposes as well as for range of motion/physical therapy.  The posterior splint is well-padded today and reapplied to the left arm for continued immobilization.  We discussed that he should be able to discontinue the splint in another 2 weeks and use it strategically if needed.  Per Dr. Wolf's instructions, patient is to start physical therapy 2 weeks postop, which is now.  The referral to physical therapy was placed at the time of surgery per Dr. Deleon.  Communication notes reviewed within the PT referral indicate that they have tried to contact the patient via telephone 3 different times and there is no voicemail set up.  I have relayed this information to the patient and instructed him to contact physical therapy to make an appointment for his initial visit.  The patient and his significant other at the bedside verbalized understanding and states that they plan to call and make him an appointment.    Norco 7.5 mg is refilled for the patient today to continue to take as needed for moderate to severe postoperative pain that is uncontrolled with nonopioid pain management methods.  Patient is reminded to take the least amount of pain medication needed to control his pain and to focus on nonopioid pain management methods as much as possible.  Recommend Tylenol, Aleve or Ibuprofen as needed for milder pain.    Follow-up in 4 weeks for recheck.    EMR  Dragon/Transciption Disclaimer: Some of this note may be an electronic transcription/translation of spoken language to printed text using the Dragon Dictation System.      Return in about 4 weeks (around 10/27/2023) for Recheck.       This document has been electronically signed by SOHAIL Lan on September 29, 2023 12:13 CDT       SOHAIL Lan